# Patient Record
Sex: FEMALE | Race: BLACK OR AFRICAN AMERICAN | NOT HISPANIC OR LATINO | URBAN - METROPOLITAN AREA
[De-identification: names, ages, dates, MRNs, and addresses within clinical notes are randomized per-mention and may not be internally consistent; named-entity substitution may affect disease eponyms.]

---

## 2024-06-19 NOTE — PROGRESS NOTES
Subjective   Yolis Arriaza is a 29 y.o.  at 18w5d by very unsure LMP who presents for a initial prenatal visit with a Group [facility].     Patient has been having FM x2 months.     1:1 Visit:   Patient currently experiencing:  denies  Bleeding or cramping since LMP: no    Ultrasound completed this pregnancy: No    Taking prenatal vitamin: No, amenable to rx being sent     Last pap: unsure if she's ever had one, amenable to one today     Patient interested in nicotine gum to help with smoking cessation.     Patient denies any hx of HTN. She reports feeling fine today. She reports occasional not severe headaches (though nothing today).     Recently she reports being congested.     Postpartum Depression: Low Risk  (2024)    Pingree  Depression Scale     Last EPDS Total Score: 4     Last EPDS Self Harm Result: Never        OB History    Para Term  AB Living   3 2 2     2   SAB IAB Ectopic Multiple Live Births           2      # Outcome Date GA Lbr Rashaad/2nd Weight Sex Delivery Anes PTL Lv   3 Current            2 Term 08/17/15 38w0d  2.353 kg F CS-LTranv   TONO      Birth Comments: ERCB   1 Term 14 39w0d  2.637 kg F CS-LTranv   TONO      Birth Comments: meconium     Prior pregnancy complications:  meconium  History of hypertension:  No    History reviewed. No pertinent past medical history.   Past Surgical History:   Procedure Laterality Date     SECTION, LOW TRANSVERSE      x2      Social History     Tobacco Use    Smoking status: Every Day     Types: Cigarettes    Smokeless tobacco: Never   Vaping Use    Vaping status: Never Used   Substance Use Topics    Alcohol use: Not Currently    Drug use: Not Currently     Types: Marijuana        Objective   Physical Exam  Weight: 72.6 kg (160 lb)  Pregravid BMI: 23.33  BP: (!) 146/105 --> 149/90  Fundal height: 30  FHTs 150    Physical Exam  Constitutional:       Appearance: Normal appearance.   HENT:      Head: Normocephalic.    Cardiovascular:      Rate and Rhythm: Normal rate and regular rhythm.      Pulses: Normal pulses.      Heart sounds: Normal heart sounds.   Pulmonary:      Effort: Pulmonary effort is normal.      Breath sounds: Wheezing present.      Comments: Wheezes present on left side, not on right  Skin:     General: Skin is warm and dry.   Neurological:      Mental Status: She is alert.   Psychiatric:         Mood and Affect: Mood normal.         Behavior: Behavior normal.         Thought Content: Thought content normal.         Judgment: Judgment normal.         Problem List Items Addressed This Visit       Elevated BP without diagnosis of hypertension    Overview     146/105 and repeat 149/90  Reviewed with Dr Mayes; HELLP labs ordered, BP cuff sent, Nifed 60 rx sent  MD appointment in 1 week          Encounter for smoking cessation counseling    Overview     Nicotine gum rx sent  Encouraged decreasing/cessation          Hx of  section    Overview     X2  2014 per patient at term for meconium  2015 ERCB  *need to sign records release re OP notes          Other Visit Diagnoses       Supervision of other normal pregnancy, antepartum ([facility])    -  Primary    Pregnancy test positive ([facility])        Relevant Medications    prenatal vitamin, iron-folic, 27 mg iron-800 mcg folic acid tablet    nicotine polacrilex (Nicorette) 2 mg gum    blood pressure test kit-large (Quick Response BP Monitor-Larg) kit    NIFEdipine ER (Adalat CC) 30 mg 24 hr tablet    Other Relevant Orders    C. Trachomatis / N. Gonorrhoeae, Amplified Detection    CBC Anemia Panel With Reflex,Pregnancy    Hemoglobin Identification with Path Review    Hepatitis B Surface Antigen    Hepatitis C Antibody    HIV 1/2 Antigen/Antibody Screen with Reflex to Confirmation    Human Chorionic Gonadotropin, Serum Quantitative    Rubella Antibody, IgG    Syphilis Screen with Reflex    Trichomonas vaginalis, Nucleic Acid Detection    Type And Screen    Urine  Culture    Varicella Zoster Antibody, IgG    Comprehensive Metabolic Panel    Uric Acid    Protein, Urine Random    US MAC OB imaging order    THINPREP PAP    Smoking (tobacco) complicating childbirth ([facility])        Relevant Medications    nicotine polacrilex (Nicorette) 2 mg gum    Screening for malignant neoplasm of cervix        Relevant Orders    THINPREP PAP            Plan   - New OB resources provided and reviewed with particular attention to dietary, travel, and medication restrictions  - Oriented to practice, CNM vs. MD care  - Reviewed IOM recommendations for weight gain given pt's BMI: 15-25 pounds (BMI 25-29.9)  - Reviewed bleeding precautions, warning signs, when to call provider; phone number provided  - The following Rx were sent to pharmacy: PNV, nicotine gum, nifedipine   - Routine NOB labs ordered  - Additional labs added: HELLP  - Urine culture sent as part of labs for asymptomatic screening only   - Discussed Centering Pregnancy with patient, interested in Education only  - Dating ultrasound ordered asap   - encouraged PCP follow up re congestion   - Transfer care to MD due to cHTN on medicaiton   The following educational material was reviewed in the Group New [facility]:  Healthy lifestyle choices in pregnancy   Centering vs Individual prenatal care   - Reviewed reasons to call: heavy vaginal bleeding, loss of fluid, strong pelvic pain, any questions/concerns  - RTC in 4 weeks or prn for next ROBV  *next visit: records release from [place] for c-sections records, Bps     1:1 total time 20min  Group Visit total time 120min    ESVIN Cardenas-JAYDE

## 2024-06-24 ENCOUNTER — LAB (OUTPATIENT)
Dept: LAB | Facility: LAB | Age: 29
End: 2024-06-24
Payer: COMMERCIAL

## 2024-06-24 ENCOUNTER — INITIAL PRENATAL (OUTPATIENT)
Dept: OBSTETRICS AND GYNECOLOGY | Facility: CLINIC | Age: 29
End: 2024-06-24
Payer: COMMERCIAL

## 2024-06-24 ENCOUNTER — PHARMACY VISIT (OUTPATIENT)
Dept: PHARMACY | Facility: CLINIC | Age: 29
End: 2024-06-24
Payer: MEDICAID

## 2024-06-24 VITALS
SYSTOLIC BLOOD PRESSURE: 146 MMHG | DIASTOLIC BLOOD PRESSURE: 105 MMHG | BODY MASS INDEX: 26.66 KG/M2 | HEIGHT: 65 IN | WEIGHT: 160 LBS

## 2024-06-24 DIAGNOSIS — R03.0 ELEVATED BP WITHOUT DIAGNOSIS OF HYPERTENSION: ICD-10-CM

## 2024-06-24 DIAGNOSIS — Z34.80 SUPERVISION OF OTHER NORMAL PREGNANCY, ANTEPARTUM (HHS-HCC): Primary | ICD-10-CM

## 2024-06-24 DIAGNOSIS — Z71.6 ENCOUNTER FOR SMOKING CESSATION COUNSELING: ICD-10-CM

## 2024-06-24 DIAGNOSIS — Z32.01 PREGNANCY TEST POSITIVE (HHS-HCC): ICD-10-CM

## 2024-06-24 DIAGNOSIS — Z98.891 HX OF CESAREAN SECTION: ICD-10-CM

## 2024-06-24 DIAGNOSIS — Z12.4 SCREENING FOR MALIGNANT NEOPLASM OF CERVIX: ICD-10-CM

## 2024-06-24 LAB
ABO GROUP (TYPE) IN BLOOD: NORMAL
ALBUMIN SERPL BCP-MCNC: 3.4 G/DL (ref 3.4–5)
ALP SERPL-CCNC: 229 U/L (ref 33–110)
ALT SERPL W P-5'-P-CCNC: 5 U/L (ref 7–45)
ANION GAP SERPL CALC-SCNC: 15 MMOL/L (ref 10–20)
ANTIBODY SCREEN: NORMAL
AST SERPL W P-5'-P-CCNC: 14 U/L (ref 9–39)
B-HCG SERPL-ACNC: ABNORMAL MIU/ML
BILIRUB SERPL-MCNC: 0.4 MG/DL (ref 0–1.2)
BUN SERPL-MCNC: 3 MG/DL (ref 6–23)
CALCIUM SERPL-MCNC: 8.8 MG/DL (ref 8.6–10.6)
CHLORIDE SERPL-SCNC: 107 MMOL/L (ref 98–107)
CO2 SERPL-SCNC: 20 MMOL/L (ref 21–32)
CREAT SERPL-MCNC: 0.5 MG/DL (ref 0.5–1.05)
CREAT UR-MCNC: 73.3 MG/DL (ref 20–320)
EGFRCR SERPLBLD CKD-EPI 2021: >90 ML/MIN/1.73M*2
ERYTHROCYTE [DISTWIDTH] IN BLOOD BY AUTOMATED COUNT: 12.7 % (ref 11.5–14.5)
GLUCOSE SERPL-MCNC: 70 MG/DL (ref 74–99)
HBV SURFACE AG SERPL QL IA: NONREACTIVE
HCT VFR BLD AUTO: 35.4 % (ref 36–46)
HCV AB SER QL: NONREACTIVE
HGB BLD-MCNC: 12.1 G/DL (ref 12–16)
HIV 1+2 AB+HIV1 P24 AG SERPL QL IA: NONREACTIVE
MCH RBC QN AUTO: 30 PG (ref 26–34)
MCHC RBC AUTO-ENTMCNC: 34.2 G/DL (ref 32–36)
MCV RBC AUTO: 88 FL (ref 80–100)
NRBC BLD-RTO: 0 /100 WBCS (ref 0–0)
PLATELET # BLD AUTO: 319 X10*3/UL (ref 150–450)
POTASSIUM SERPL-SCNC: 3.8 MMOL/L (ref 3.5–5.3)
PROT SERPL-MCNC: 6.3 G/DL (ref 6.4–8.2)
PROT UR-ACNC: 11 MG/DL (ref 5–24)
PROT/CREAT UR: 0.15 MG/MG CREAT (ref 0–0.17)
RBC # BLD AUTO: 4.04 X10*6/UL (ref 4–5.2)
REFLEX ADDED, ANEMIA PANEL: NORMAL
RH FACTOR (ANTIGEN D): NORMAL
RUBV IGG SERPL IA-ACNC: 0.5 IA
RUBV IGG SERPL QL IA: NEGATIVE
SODIUM SERPL-SCNC: 138 MMOL/L (ref 136–145)
TREPONEMA PALLIDUM IGG+IGM AB [PRESENCE] IN SERUM OR PLASMA BY IMMUNOASSAY: NONREACTIVE
URATE SERPL-MCNC: 5.3 MG/DL (ref 2.3–6.7)
VARICELLA ZOSTER IGG INDEX: 0.3 IA
VZV IGG SER QL IA: NEGATIVE
WBC # BLD AUTO: 10.3 X10*3/UL (ref 4.4–11.3)

## 2024-06-24 PROCEDURE — 80053 COMPREHEN METABOLIC PANEL: CPT

## 2024-06-24 PROCEDURE — 86900 BLOOD TYPING SEROLOGIC ABO: CPT

## 2024-06-24 PROCEDURE — 99214 OFFICE O/P EST MOD 30 MIN: CPT | Performed by: ADVANCED PRACTICE MIDWIFE

## 2024-06-24 PROCEDURE — 99204 OFFICE O/P NEW MOD 45 MIN: CPT | Performed by: ADVANCED PRACTICE MIDWIFE

## 2024-06-24 PROCEDURE — 86780 TREPONEMA PALLIDUM: CPT

## 2024-06-24 PROCEDURE — 86317 IMMUNOASSAY INFECTIOUS AGENT: CPT

## 2024-06-24 PROCEDURE — 36415 COLL VENOUS BLD VENIPUNCTURE: CPT

## 2024-06-24 PROCEDURE — RXMED WILLOW AMBULATORY MEDICATION CHARGE

## 2024-06-24 PROCEDURE — 85027 COMPLETE CBC AUTOMATED: CPT

## 2024-06-24 PROCEDURE — 86850 RBC ANTIBODY SCREEN: CPT

## 2024-06-24 PROCEDURE — 86787 VARICELLA-ZOSTER ANTIBODY: CPT

## 2024-06-24 PROCEDURE — 84702 CHORIONIC GONADOTROPIN TEST: CPT

## 2024-06-24 PROCEDURE — 87661 TRICHOMONAS VAGINALIS AMPLIF: CPT | Performed by: ADVANCED PRACTICE MIDWIFE

## 2024-06-24 PROCEDURE — 82570 ASSAY OF URINE CREATININE: CPT | Performed by: ADVANCED PRACTICE MIDWIFE

## 2024-06-24 PROCEDURE — 84550 ASSAY OF BLOOD/URIC ACID: CPT

## 2024-06-24 PROCEDURE — 87389 HIV-1 AG W/HIV-1&-2 AB AG IA: CPT

## 2024-06-24 PROCEDURE — 86803 HEPATITIS C AB TEST: CPT

## 2024-06-24 PROCEDURE — 87340 HEPATITIS B SURFACE AG IA: CPT

## 2024-06-24 PROCEDURE — 87491 CHLMYD TRACH DNA AMP PROBE: CPT | Performed by: ADVANCED PRACTICE MIDWIFE

## 2024-06-24 PROCEDURE — 83021 HEMOGLOBIN CHROMOTOGRAPHY: CPT

## 2024-06-24 PROCEDURE — 86901 BLOOD TYPING SEROLOGIC RH(D): CPT

## 2024-06-24 PROCEDURE — 83020 HEMOGLOBIN ELECTROPHORESIS: CPT | Performed by: ADVANCED PRACTICE MIDWIFE

## 2024-06-24 PROCEDURE — 87086 URINE CULTURE/COLONY COUNT: CPT | Performed by: ADVANCED PRACTICE MIDWIFE

## 2024-06-24 RX ORDER — MICONAZOLE NITRATE 2 %
2 CREAM (GRAM) TOPICAL EVERY 2 HOUR PRN
Qty: 110 EACH | Refills: 3 | Status: SHIPPED | OUTPATIENT
Start: 2024-06-24 | End: 2024-07-24

## 2024-06-24 RX ORDER — NIFEDIPINE 30 MG/1
60 TABLET, FILM COATED, EXTENDED RELEASE ORAL
Qty: 60 TABLET | Refills: 11 | Status: SHIPPED | OUTPATIENT
Start: 2024-06-24 | End: 2025-06-24

## 2024-06-24 RX ORDER — ACETAMINOPHEN 500 MG
1 TABLET ORAL DAILY
Qty: 1 EACH | Refills: 0 | Status: SHIPPED | OUTPATIENT
Start: 2024-06-24

## 2024-06-24 ASSESSMENT — EDINBURGH POSTNATAL DEPRESSION SCALE (EPDS)
I HAVE BEEN SO UNHAPPY THAT I HAVE HAD DIFFICULTY SLEEPING: NOT AT ALL
I HAVE LOOKED FORWARD WITH ENJOYMENT TO THINGS: AS MUCH AS I EVER DID
I HAVE FELT SAD OR MISERABLE: NO, NOT AT ALL
THINGS HAVE BEEN GETTING ON TOP OF ME: NO, MOST OF THE TIME I HAVE COPED QUITE WELL
I HAVE BEEN ANXIOUS OR WORRIED FOR NO GOOD REASON: HARDLY EVER
THE THOUGHT OF HARMING MYSELF HAS OCCURRED TO ME: NEVER
TOTAL SCORE: 4
I HAVE BLAMED MYSELF UNNECESSARILY WHEN THINGS WENT WRONG: NOT VERY OFTEN
I HAVE FELT SCARED OR PANICKY FOR NO GOOD REASON: NO, NOT MUCH
I HAVE BEEN SO UNHAPPY THAT I HAVE BEEN CRYING: NO, NEVER
I HAVE BEEN ABLE TO LAUGH AND SEE THE FUNNY SIDE OF THINGS: AS MUCH AS I ALWAYS COULD

## 2024-06-25 ENCOUNTER — HOSPITAL ENCOUNTER (OUTPATIENT)
Dept: RADIOLOGY | Facility: CLINIC | Age: 29
Discharge: HOME | End: 2024-06-25
Payer: COMMERCIAL

## 2024-06-25 ENCOUNTER — ANCILLARY ORDERS (OUTPATIENT)
Dept: OBSTETRICS AND GYNECOLOGY | Facility: CLINIC | Age: 29
End: 2024-06-25
Payer: COMMERCIAL

## 2024-06-25 DIAGNOSIS — Z98.891 HX OF CESAREAN SECTION: ICD-10-CM

## 2024-06-25 DIAGNOSIS — Z32.01 PREGNANCY TEST POSITIVE (HHS-HCC): Primary | ICD-10-CM

## 2024-06-25 DIAGNOSIS — R03.0 ELEVATED BP WITHOUT DIAGNOSIS OF HYPERTENSION: ICD-10-CM

## 2024-06-25 DIAGNOSIS — Z28.39 RUBELLA NON-IMMUNE STATUS, ANTEPARTUM (HHS-HCC): ICD-10-CM

## 2024-06-25 DIAGNOSIS — O09.899 MATERNAL VARICELLA, NON-IMMUNE (HHS-HCC): ICD-10-CM

## 2024-06-25 DIAGNOSIS — Z03.74 ENCOUNTER FOR SUSPECTED PROBLEM WITH FETAL GROWTH RULED OUT: ICD-10-CM

## 2024-06-25 DIAGNOSIS — O09.899 RUBELLA NON-IMMUNE STATUS, ANTEPARTUM (HHS-HCC): ICD-10-CM

## 2024-06-25 DIAGNOSIS — Z32.01 PREGNANCY TEST POSITIVE (HHS-HCC): ICD-10-CM

## 2024-06-25 DIAGNOSIS — Z71.6 ENCOUNTER FOR SMOKING CESSATION COUNSELING: ICD-10-CM

## 2024-06-25 DIAGNOSIS — O10.013 PRE-EXISTING ESSENTIAL HYPERTENSION COMPLICATING PREGNANCY, THIRD TRIMESTER (HHS-HCC): ICD-10-CM

## 2024-06-25 DIAGNOSIS — Z28.39 MATERNAL VARICELLA, NON-IMMUNE (HHS-HCC): ICD-10-CM

## 2024-06-25 LAB
C TRACH RRNA SPEC QL NAA+PROBE: NEGATIVE
HEMOGLOBIN A2: 2.6 % (ref 2–3.5)
HEMOGLOBIN A: 97.1 % (ref 95.8–98)
HEMOGLOBIN F: 0.3 % (ref 0–2)
HEMOGLOBIN IDENTIFICATION INTERPRETATION: NORMAL
N GONORRHOEA DNA SPEC QL PROBE+SIG AMP: NEGATIVE
PATH REVIEW-HGB IDENTIFICATION: NORMAL
T VAGINALIS RRNA SPEC QL NAA+PROBE: NEGATIVE

## 2024-06-25 PROCEDURE — 76811 OB US DETAILED SNGL FETUS: CPT

## 2024-06-25 PROCEDURE — 76819 FETAL BIOPHYS PROFIL W/O NST: CPT

## 2024-06-25 PROCEDURE — 76811 OB US DETAILED SNGL FETUS: CPT | Performed by: OBSTETRICS & GYNECOLOGY

## 2024-06-25 PROCEDURE — 76819 FETAL BIOPHYS PROFIL W/O NST: CPT | Performed by: OBSTETRICS & GYNECOLOGY

## 2024-06-26 DIAGNOSIS — O23.43 URINARY TRACT INFECTION IN MOTHER DURING THIRD TRIMESTER OF PREGNANCY (HHS-HCC): Primary | ICD-10-CM

## 2024-06-26 LAB — BACTERIA UR CULT: ABNORMAL

## 2024-06-26 RX ORDER — NITROFURANTOIN 25; 75 MG/1; MG/1
100 CAPSULE ORAL 2 TIMES DAILY
Qty: 14 CAPSULE | Refills: 0 | Status: SHIPPED | OUTPATIENT
Start: 2024-06-26 | End: 2024-07-03

## 2024-07-01 ENCOUNTER — HOSPITAL ENCOUNTER (OUTPATIENT)
Dept: RADIOLOGY | Facility: CLINIC | Age: 29
Discharge: HOME | End: 2024-07-01
Payer: COMMERCIAL

## 2024-07-01 ENCOUNTER — PROCEDURE VISIT (OUTPATIENT)
Dept: OBSTETRICS AND GYNECOLOGY | Facility: CLINIC | Age: 29
End: 2024-07-01
Payer: COMMERCIAL

## 2024-07-01 ENCOUNTER — APPOINTMENT (OUTPATIENT)
Dept: LAB | Facility: LAB | Age: 29
End: 2024-07-01
Payer: COMMERCIAL

## 2024-07-01 ENCOUNTER — ROUTINE PRENATAL (OUTPATIENT)
Dept: OBSTETRICS AND GYNECOLOGY | Facility: CLINIC | Age: 29
End: 2024-07-01
Payer: COMMERCIAL

## 2024-07-01 ENCOUNTER — TELEPHONE (OUTPATIENT)
Dept: OBSTETRICS AND GYNECOLOGY | Facility: HOSPITAL | Age: 29
End: 2024-07-01

## 2024-07-01 ENCOUNTER — PHARMACY VISIT (OUTPATIENT)
Dept: PHARMACY | Facility: CLINIC | Age: 29
End: 2024-07-01
Payer: MEDICAID

## 2024-07-01 VITALS — DIASTOLIC BLOOD PRESSURE: 79 MMHG | SYSTOLIC BLOOD PRESSURE: 147 MMHG | WEIGHT: 161.8 LBS | BODY MASS INDEX: 26.96 KG/M2

## 2024-07-01 DIAGNOSIS — O36.8330 MATERNAL CARE FOR ABNORMALITIES OF THE FETAL HEART RATE OR RHYTHM, THIRD TRIMESTER, NOT APPLICABLE OR UNSPECIFIED (HHS-HCC): ICD-10-CM

## 2024-07-01 DIAGNOSIS — O10.013 PRE-EXISTING ESSENTIAL HYPERTENSION COMPLICATING PREGNANCY, THIRD TRIMESTER (HHS-HCC): ICD-10-CM

## 2024-07-01 DIAGNOSIS — O13.3 GESTATIONAL HYPERTENSION, THIRD TRIMESTER (HHS-HCC): Primary | ICD-10-CM

## 2024-07-01 DIAGNOSIS — Z03.74 ENCOUNTER FOR SUSPECTED PROBLEM WITH FETAL GROWTH RULED OUT: ICD-10-CM

## 2024-07-01 DIAGNOSIS — O13.3 GESTATIONAL HYPERTENSION, THIRD TRIMESTER (HHS-HCC): ICD-10-CM

## 2024-07-01 DIAGNOSIS — O09.93 SUPERVISION OF HIGH RISK PREGNANCY IN THIRD TRIMESTER (HHS-HCC): Primary | ICD-10-CM

## 2024-07-01 DIAGNOSIS — O23.43 URINARY TRACT INFECTION IN MOTHER DURING THIRD TRIMESTER OF PREGNANCY (HHS-HCC): ICD-10-CM

## 2024-07-01 DIAGNOSIS — Z98.891 HX OF CESAREAN SECTION: ICD-10-CM

## 2024-07-01 DIAGNOSIS — O09.33 LIMITED PRENATAL CARE IN THIRD TRIMESTER (HHS-HCC): ICD-10-CM

## 2024-07-01 LAB — GLUCOSE 1H P 50 G GLC PO SERPL-MCNC: 124 MG/DL

## 2024-07-01 PROCEDURE — 76819 FETAL BIOPHYS PROFIL W/O NST: CPT

## 2024-07-01 PROCEDURE — RXMED WILLOW AMBULATORY MEDICATION CHARGE

## 2024-07-01 PROCEDURE — 99214 OFFICE O/P EST MOD 30 MIN: CPT | Mod: GC,TH,25

## 2024-07-01 PROCEDURE — 90715 TDAP VACCINE 7 YRS/> IM: CPT | Mod: GC

## 2024-07-01 PROCEDURE — 82947 ASSAY GLUCOSE BLOOD QUANT: CPT

## 2024-07-01 PROCEDURE — 99214 OFFICE O/P EST MOD 30 MIN: CPT

## 2024-07-01 PROCEDURE — 36415 COLL VENOUS BLD VENIPUNCTURE: CPT

## 2024-07-01 PROCEDURE — 59025 FETAL NON-STRESS TEST: CPT | Performed by: OBSTETRICS & GYNECOLOGY

## 2024-07-01 RX ORDER — NITROFURANTOIN 25; 75 MG/1; MG/1
100 CAPSULE ORAL 2 TIMES DAILY
Qty: 14 CAPSULE | Refills: 0 | Status: SHIPPED | OUTPATIENT
Start: 2024-07-01 | End: 2024-07-08

## 2024-07-01 RX ORDER — ASPIRIN 81 MG/1
81 TABLET ORAL DAILY
Qty: 90 TABLET | Refills: 3 | Status: SHIPPED | OUTPATIENT
Start: 2024-07-01 | End: 2025-07-01

## 2024-07-01 NOTE — ASSESSMENT & PLAN NOTE
Late to establish care, New OB visit 6/24 with very unsure LMP dating approx 18wks.   Anatomy scan 6/25 dating 32wks.

## 2024-07-01 NOTE — PROGRESS NOTES
I saw and evaluated the patient. I personally obtained the key and critical portions of the history and physical exam or was physically present for key and critical portions performed by the resident/fellow. I reviewed the resident/fellow's documentation and discussed the patient with the resident/fellow. I agree with the resident/fellow's medical decision making as documented in the note.    Dinorah Turner MD

## 2024-07-01 NOTE — PROGRESS NOTES
OB Follow-up  24     SUBJECTIVE    HPI: Yolis Arriaza is a 29 y.o.  at 32w6d here for RPNV.   Late establish to prenatal care, initial prenatal visit 2 wks ago.  Denies contractions, bleeding, or LOF. Reports normal fetal movement. Patient reports headaches that occur when she wakes from naps that eventually go away with rest and tylenol. Denies vision changes, CP/SOB, RUQ pain.      OBJECTIVE  Visit Vitals  /79 Comment: 108   Wt 73.4 kg (161 lb 12.8 oz)   LMP 2024   BMI 26.96 kg/m²   OB Status Pregnant   Smoking Status Every Day   BSA 1.83 m²      FHT: 156    ASSESSMENT & PLAN  Yolis Arriaza is a 29 y.o.  at 32w6d here for the following concerns we addressed today:    Gestational hypertension, third trimester ([facility])  Home Bps have been 140s/90s on Nifed 60.   Discussed return precautions, added pt on for NST today.    Urinary tract infection in mother during third trimester of pregnancy ([facility])  Not yet taking abx, macrobid rx resent.    Hx of  section  States previously had low lying placenta in prior pregnancy.  Briefly discussed TOLAC versus elective repeat  section, including r/b/a of each.   Continue to discuss, if patient desires TOLAC will need consents signed at next visit.    Supervision of high risk pregnancy in third trimester ([facility])  Tdap today.  1 hr to be collected.  NST added on.    Limited prenatal care in third trimester ([facility])  Late to establish care, New OB visit  with very unsure LMP dating approx 18wks.   Anatomy scan  dating 32wks.       Orders Placed This Encounter   Procedures    Tdap vaccine, age 7 years and older  (BOOSTRIX)    Glucose, 1 Hour Screen, Pregnancy     Standing Status:   Future     Standing Expiration Date:   2025     Order Specific Question:   Release result to [place]     Answer:   Immediate [1]      RTC in 1 week    Patient seen and evaluated with Dr. Johnny Cline MD   PGY-2,  Obstetrics and Gynecology

## 2024-07-01 NOTE — PROCEDURES
Yolis Arriaza, a  at 32w6d with an STEPHON of 2024, by Ultrasound, was seen at [facility] for a nonstress test.    Non-Stress Test   Baseline Fetal Heart Rate for Non-Stress Test: 140 BPM  Variability in Waveform for Non-Stress Test: Moderate  Accelerations in Non-Stress Test: No  Decelerations in Non-Stress Test: None  Contractions in Non-Stress Test: Not present  Acoustic Stimulator for Non-Stress Test: Yes  Interpretation of Non-Stress Test   Interpretation of Non-Stress Test: (!) Non-reactive

## 2024-07-01 NOTE — TELEPHONE ENCOUNTER
----- Message from ESVIN Cardenas-CNM sent at 6/26/2024  1:36 PM EDT -----  UTI; macrobid rx sent  Please call and let her know about this  Please also see about how her Bps have been at home     Phone number on file not accepting phone calls  Patient seen in office today at [place] for appointment and was informed of urine culture results and treatment as well as home BP readings  Did not directly speak with patient regarding results, will notify provider  Marilyn Crawford RN

## 2024-07-01 NOTE — PATIENT INSTRUCTIONS
You can try compression stockings for the leg swelling.   Continue to take your Nifedipine 60mg daily. Take your blood pressure 2-3 times per day. Call the office or visit OB Triage if your blood pressure is greater than 160 (top number) or greater than 110 (bottom number). If you have a headache that is not responsive to tylenol, or vision changes, please visit OB triage to be evaluated.    You will need weekly appointments and NSTs.

## 2024-07-01 NOTE — ASSESSMENT & PLAN NOTE
Home Bps have been 140s/90s on Nifed 60.   Discussed return precautions, added pt on for NST today.

## 2024-07-01 NOTE — ASSESSMENT & PLAN NOTE
States previously had low lying placenta in prior pregnancy.  Briefly discussed TOLAC versus elective repeat  section, including r/b/a of each.   Continue to discuss, if patient desires TOLAC will need consents signed at next visit.

## 2024-07-03 PROBLEM — O09.33 LIMITED PRENATAL CARE IN THIRD TRIMESTER (HHS-HCC): Status: RESOLVED | Noted: 2024-07-01 | Resolved: 2024-07-03

## 2024-07-03 PROBLEM — O09.93 SUPERVISION OF HIGH RISK PREGNANCY IN THIRD TRIMESTER (HHS-HCC): Status: RESOLVED | Noted: 2024-07-01 | Resolved: 2024-07-03

## 2024-07-05 LAB
CYTOLOGY CMNT CVX/VAG CYTO-IMP: NORMAL
LAB AP HPV GENOTYPE QUESTION: YES
LAB AP HPV HR: NORMAL
LABORATORY COMMENT REPORT: NORMAL
LMP START DATE: NORMAL
MENSTRUAL HX REPORTED: NORMAL
PATH REPORT.TOTAL CANCER: NORMAL

## 2024-07-08 ENCOUNTER — APPOINTMENT (OUTPATIENT)
Dept: OBSTETRICS AND GYNECOLOGY | Facility: CLINIC | Age: 29
End: 2024-07-08
Payer: COMMERCIAL

## 2024-07-16 ENCOUNTER — APPOINTMENT (OUTPATIENT)
Dept: OBSTETRICS AND GYNECOLOGY | Facility: CLINIC | Age: 29
End: 2024-07-16
Payer: COMMERCIAL

## 2024-07-18 ENCOUNTER — ANESTHESIA EVENT (OUTPATIENT)
Dept: OBSTETRICS AND GYNECOLOGY | Facility: HOSPITAL | Age: 29
End: 2024-07-18
Payer: COMMERCIAL

## 2024-07-18 ENCOUNTER — HOSPITAL ENCOUNTER (INPATIENT)
Facility: HOSPITAL | Age: 29
LOS: 3 days | Discharge: HOME | End: 2024-07-21
Attending: SPECIALIST | Admitting: OBSTETRICS & GYNECOLOGY
Payer: COMMERCIAL

## 2024-07-18 ENCOUNTER — ANESTHESIA (OUTPATIENT)
Dept: OBSTETRICS AND GYNECOLOGY | Facility: HOSPITAL | Age: 29
End: 2024-07-18
Payer: COMMERCIAL

## 2024-07-18 DIAGNOSIS — O11.9 PRE-ECLAMPSIA SUPERIMPOSED ON CHRONIC HYPERTENSION (HHS-HCC): ICD-10-CM

## 2024-07-18 DIAGNOSIS — I10 HYPERTENSION, UNSPECIFIED TYPE: ICD-10-CM

## 2024-07-18 DIAGNOSIS — Z71.6 ENCOUNTER FOR TOBACCO USE CESSATION COUNSELING: ICD-10-CM

## 2024-07-18 PROBLEM — Z98.891 HISTORY OF CESAREAN DELIVERY: Status: ACTIVE | Noted: 2024-07-18

## 2024-07-18 PROBLEM — K21.9 GASTROESOPHAGEAL REFLUX DISEASE: Status: ACTIVE | Noted: 2024-07-18

## 2024-07-18 LAB
ABO GROUP (TYPE) IN BLOOD: NORMAL
ALBUMIN SERPL BCP-MCNC: 3 G/DL (ref 3.4–5)
ALBUMIN SERPL BCP-MCNC: 3.1 G/DL (ref 3.4–5)
ALP SERPL-CCNC: 225 U/L (ref 33–110)
ALP SERPL-CCNC: 263 U/L (ref 33–110)
ALT SERPL W P-5'-P-CCNC: 6 U/L (ref 7–45)
ALT SERPL W P-5'-P-CCNC: 7 U/L (ref 7–45)
ANION GAP SERPL CALC-SCNC: 14 MMOL/L (ref 10–20)
ANION GAP SERPL CALC-SCNC: 16 MMOL/L (ref 10–20)
ANTIBODY SCREEN: NORMAL
AST SERPL W P-5'-P-CCNC: 19 U/L (ref 9–39)
AST SERPL W P-5'-P-CCNC: 20 U/L (ref 9–39)
BILIRUB SERPL-MCNC: 0.3 MG/DL (ref 0–1.2)
BILIRUB SERPL-MCNC: 0.4 MG/DL (ref 0–1.2)
BLOOD EXPIRATION DATE: NORMAL
BUN SERPL-MCNC: 7 MG/DL (ref 6–23)
BUN SERPL-MCNC: 8 MG/DL (ref 6–23)
CALCIUM SERPL-MCNC: 7.9 MG/DL (ref 8.6–10.6)
CALCIUM SERPL-MCNC: 8.5 MG/DL (ref 8.6–10.6)
CHLORIDE SERPL-SCNC: 105 MMOL/L (ref 98–107)
CHLORIDE SERPL-SCNC: 108 MMOL/L (ref 98–107)
CO2 SERPL-SCNC: 17 MMOL/L (ref 21–32)
CO2 SERPL-SCNC: 19 MMOL/L (ref 21–32)
CREAT SERPL-MCNC: 0.45 MG/DL (ref 0.5–1.05)
CREAT SERPL-MCNC: 0.49 MG/DL (ref 0.5–1.05)
DISPENSE STATUS: NORMAL
EGFRCR SERPLBLD CKD-EPI 2021: >90 ML/MIN/1.73M*2
EGFRCR SERPLBLD CKD-EPI 2021: >90 ML/MIN/1.73M*2
ERYTHROCYTE [DISTWIDTH] IN BLOOD BY AUTOMATED COUNT: 12.5 % (ref 11.5–14.5)
ERYTHROCYTE [DISTWIDTH] IN BLOOD BY AUTOMATED COUNT: 12.9 % (ref 11.5–14.5)
GLUCOSE SERPL-MCNC: 68 MG/DL (ref 74–99)
GLUCOSE SERPL-MCNC: 81 MG/DL (ref 74–99)
HCT VFR BLD AUTO: 31.8 % (ref 36–46)
HCT VFR BLD AUTO: 36.3 % (ref 36–46)
HGB BLD-MCNC: 11.2 G/DL (ref 12–16)
HGB BLD-MCNC: 12.3 G/DL (ref 12–16)
MCH RBC QN AUTO: 28.9 PG (ref 26–34)
MCH RBC QN AUTO: 29 PG (ref 26–34)
MCHC RBC AUTO-ENTMCNC: 33.9 G/DL (ref 32–36)
MCHC RBC AUTO-ENTMCNC: 35.2 G/DL (ref 32–36)
MCV RBC AUTO: 82 FL (ref 80–100)
MCV RBC AUTO: 86 FL (ref 80–100)
NRBC BLD-RTO: 0 /100 WBCS (ref 0–0)
NRBC BLD-RTO: 0 /100 WBCS (ref 0–0)
PLATELET # BLD AUTO: 289 X10*3/UL (ref 150–450)
PLATELET # BLD AUTO: 297 X10*3/UL (ref 150–450)
POTASSIUM SERPL-SCNC: 3.9 MMOL/L (ref 3.5–5.3)
POTASSIUM SERPL-SCNC: 4.1 MMOL/L (ref 3.5–5.3)
PRODUCT BLOOD TYPE: 5100
PRODUCT CODE: NORMAL
PROT SERPL-MCNC: 5.5 G/DL (ref 6.4–8.2)
PROT SERPL-MCNC: 6.1 G/DL (ref 6.4–8.2)
RBC # BLD AUTO: 3.87 X10*6/UL (ref 4–5.2)
RBC # BLD AUTO: 4.24 X10*6/UL (ref 4–5.2)
RH FACTOR (ANTIGEN D): NORMAL
SODIUM SERPL-SCNC: 134 MMOL/L (ref 136–145)
SODIUM SERPL-SCNC: 137 MMOL/L (ref 136–145)
TREPONEMA PALLIDUM IGG+IGM AB [PRESENCE] IN SERUM OR PLASMA BY IMMUNOASSAY: NONREACTIVE
UNIT ABO: NORMAL
UNIT NUMBER: NORMAL
UNIT RH: NORMAL
UNIT VOLUME: 350
WBC # BLD AUTO: 10 X10*3/UL (ref 4.4–11.3)
WBC # BLD AUTO: 17.4 X10*3/UL (ref 4.4–11.3)
XM INTEP: NORMAL

## 2024-07-18 PROCEDURE — 99199 UNLISTED SPECIAL SVC PX/RPRT: CPT

## 2024-07-18 PROCEDURE — 2500000004 HC RX 250 GENERAL PHARMACY W/ HCPCS (ALT 636 FOR OP/ED)

## 2024-07-18 PROCEDURE — 1100000001 HC PRIVATE ROOM DAILY

## 2024-07-18 PROCEDURE — 80053 COMPREHEN METABOLIC PANEL: CPT | Performed by: STUDENT IN AN ORGANIZED HEALTH CARE EDUCATION/TRAINING PROGRAM

## 2024-07-18 PROCEDURE — 2500000004 HC RX 250 GENERAL PHARMACY W/ HCPCS (ALT 636 FOR OP/ED): Performed by: NURSE ANESTHETIST, CERTIFIED REGISTERED

## 2024-07-18 PROCEDURE — 7100000016 HC LABOR RECOVERY PER HOUR: Performed by: OBSTETRICS & GYNECOLOGY

## 2024-07-18 PROCEDURE — 36415 COLL VENOUS BLD VENIPUNCTURE: CPT

## 2024-07-18 PROCEDURE — 85027 COMPLETE CBC AUTOMATED: CPT | Performed by: STUDENT IN AN ORGANIZED HEALTH CARE EDUCATION/TRAINING PROGRAM

## 2024-07-18 PROCEDURE — 2500000004 HC RX 250 GENERAL PHARMACY W/ HCPCS (ALT 636 FOR OP/ED): Performed by: STUDENT IN AN ORGANIZED HEALTH CARE EDUCATION/TRAINING PROGRAM

## 2024-07-18 PROCEDURE — 87081 CULTURE SCREEN ONLY: CPT | Performed by: STUDENT IN AN ORGANIZED HEALTH CARE EDUCATION/TRAINING PROGRAM

## 2024-07-18 PROCEDURE — 2500000001 HC RX 250 WO HCPCS SELF ADMINISTERED DRUGS (ALT 637 FOR MEDICARE OP): Performed by: NURSE ANESTHETIST, CERTIFIED REGISTERED

## 2024-07-18 PROCEDURE — 86780 TREPONEMA PALLIDUM: CPT | Performed by: STUDENT IN AN ORGANIZED HEALTH CARE EDUCATION/TRAINING PROGRAM

## 2024-07-18 PROCEDURE — 59514 CESAREAN DELIVERY ONLY: CPT

## 2024-07-18 PROCEDURE — 36415 COLL VENOUS BLD VENIPUNCTURE: CPT | Performed by: STUDENT IN AN ORGANIZED HEALTH CARE EDUCATION/TRAINING PROGRAM

## 2024-07-18 PROCEDURE — 59514 CESAREAN DELIVERY ONLY: CPT | Performed by: OBSTETRICS & GYNECOLOGY

## 2024-07-18 PROCEDURE — 86901 BLOOD TYPING SEROLOGIC RH(D): CPT | Performed by: STUDENT IN AN ORGANIZED HEALTH CARE EDUCATION/TRAINING PROGRAM

## 2024-07-18 PROCEDURE — 99215 OFFICE O/P EST HI 40 MIN: CPT

## 2024-07-18 PROCEDURE — 2720000007 HC OR 272 NO HCPCS: Performed by: OBSTETRICS & GYNECOLOGY

## 2024-07-18 PROCEDURE — 2500000002 HC RX 250 W HCPCS SELF ADMINISTERED DRUGS (ALT 637 FOR MEDICARE OP, ALT 636 FOR OP/ED)

## 2024-07-18 PROCEDURE — 86923 COMPATIBILITY TEST ELECTRIC: CPT

## 2024-07-18 PROCEDURE — 0UH90HZ INSERTION OF CONTRACEPTIVE DEVICE INTO UTERUS, OPEN APPROACH: ICD-10-PCS | Performed by: OBSTETRICS & GYNECOLOGY

## 2024-07-18 PROCEDURE — 85027 COMPLETE CBC AUTOMATED: CPT

## 2024-07-18 PROCEDURE — 2500000001 HC RX 250 WO HCPCS SELF ADMINISTERED DRUGS (ALT 637 FOR MEDICARE OP)

## 2024-07-18 PROCEDURE — 58300 INSERT INTRAUTERINE DEVICE: CPT

## 2024-07-18 PROCEDURE — 2500000002 HC RX 250 W HCPCS SELF ADMINISTERED DRUGS (ALT 637 FOR MEDICARE OP, ALT 636 FOR OP/ED): Performed by: STUDENT IN AN ORGANIZED HEALTH CARE EDUCATION/TRAINING PROGRAM

## 2024-07-18 PROCEDURE — 80053 COMPREHEN METABOLIC PANEL: CPT

## 2024-07-18 PROCEDURE — 51702 INSERT TEMP BLADDER CATH: CPT

## 2024-07-18 PROCEDURE — 87086 URINE CULTURE/COLONY COUNT: CPT | Performed by: STUDENT IN AN ORGANIZED HEALTH CARE EDUCATION/TRAINING PROGRAM

## 2024-07-18 PROCEDURE — 3700000014 HC AN EPIDURAL BLOCK CHARGE: Performed by: OBSTETRICS & GYNECOLOGY

## 2024-07-18 RX ORDER — OXYTOCIN 10 [USP'U]/ML
10 INJECTION, SOLUTION INTRAMUSCULAR; INTRAVENOUS ONCE AS NEEDED
Status: DISCONTINUED | OUTPATIENT
Start: 2024-07-18 | End: 2024-07-21 | Stop reason: HOSPADM

## 2024-07-18 RX ORDER — NIFEDIPINE 30 MG/1
30 TABLET, FILM COATED, EXTENDED RELEASE ORAL ONCE
Status: COMPLETED | OUTPATIENT
Start: 2024-07-18 | End: 2024-07-18

## 2024-07-18 RX ORDER — MISOPROSTOL 200 UG/1
800 TABLET ORAL ONCE AS NEEDED
Status: DISCONTINUED | OUTPATIENT
Start: 2024-07-18 | End: 2024-07-18 | Stop reason: HOSPADM

## 2024-07-18 RX ORDER — BUPIVACAINE HYDROCHLORIDE 7.5 MG/ML
INJECTION, SOLUTION INTRASPINAL AS NEEDED
Status: DISCONTINUED | OUTPATIENT
Start: 2024-07-18 | End: 2024-07-18

## 2024-07-18 RX ORDER — ACETAMINOPHEN 120 MG/1
SUPPOSITORY RECTAL AS NEEDED
Status: DISCONTINUED | OUTPATIENT
Start: 2024-07-18 | End: 2024-07-18

## 2024-07-18 RX ORDER — CALCIUM GLUCONATE 98 MG/ML
1 INJECTION, SOLUTION INTRAVENOUS ONCE AS NEEDED
Status: DISCONTINUED | OUTPATIENT
Start: 2024-07-18 | End: 2024-07-18

## 2024-07-18 RX ORDER — ACETAMINOPHEN 325 MG/1
975 TABLET ORAL EVERY 6 HOURS
Status: DISCONTINUED | OUTPATIENT
Start: 2024-07-18 | End: 2024-07-21 | Stop reason: HOSPADM

## 2024-07-18 RX ORDER — OXYCODONE HYDROCHLORIDE 5 MG/1
5 TABLET ORAL EVERY 4 HOURS PRN
Status: DISCONTINUED | OUTPATIENT
Start: 2024-07-19 | End: 2024-07-21 | Stop reason: HOSPADM

## 2024-07-18 RX ORDER — DIPHENHYDRAMINE HYDROCHLORIDE 50 MG/ML
25 INJECTION INTRAMUSCULAR; INTRAVENOUS EVERY 4 HOURS PRN
Status: DISCONTINUED | OUTPATIENT
Start: 2024-07-18 | End: 2024-07-21 | Stop reason: HOSPADM

## 2024-07-18 RX ORDER — NIFEDIPINE 60 MG/1
120 TABLET, FILM COATED, EXTENDED RELEASE ORAL
Status: DISCONTINUED | OUTPATIENT
Start: 2024-07-19 | End: 2024-07-21 | Stop reason: HOSPADM

## 2024-07-18 RX ORDER — HYDROMORPHONE HYDROCHLORIDE 1 MG/ML
0.2 INJECTION, SOLUTION INTRAMUSCULAR; INTRAVENOUS; SUBCUTANEOUS EVERY 5 MIN PRN
Status: DISCONTINUED | OUTPATIENT
Start: 2024-07-18 | End: 2024-07-20

## 2024-07-18 RX ORDER — LOPERAMIDE HYDROCHLORIDE 2 MG/1
4 CAPSULE ORAL EVERY 2 HOUR PRN
Status: DISCONTINUED | OUTPATIENT
Start: 2024-07-18 | End: 2024-07-21 | Stop reason: HOSPADM

## 2024-07-18 RX ORDER — LABETALOL HYDROCHLORIDE 5 MG/ML
INJECTION, SOLUTION INTRAVENOUS
Status: COMPLETED
Start: 2024-07-18 | End: 2024-07-18

## 2024-07-18 RX ORDER — METHYLERGONOVINE MALEATE 0.2 MG/ML
0.2 INJECTION INTRAVENOUS ONCE AS NEEDED
Status: DISCONTINUED | OUTPATIENT
Start: 2024-07-18 | End: 2024-07-21 | Stop reason: HOSPADM

## 2024-07-18 RX ORDER — AZITHROMYCIN 100 MG/ML
INJECTION, POWDER, LYOPHILIZED, FOR SOLUTION INTRAVENOUS AS NEEDED
Status: DISCONTINUED | OUTPATIENT
Start: 2024-07-18 | End: 2024-07-18

## 2024-07-18 RX ORDER — DIPHENHYDRAMINE HCL 25 MG
25 CAPSULE ORAL EVERY 4 HOURS PRN
Status: DISCONTINUED | OUTPATIENT
Start: 2024-07-18 | End: 2024-07-21 | Stop reason: HOSPADM

## 2024-07-18 RX ORDER — BISACODYL 10 MG/1
10 SUPPOSITORY RECTAL DAILY PRN
Status: DISCONTINUED | OUTPATIENT
Start: 2024-07-18 | End: 2024-07-21 | Stop reason: HOSPADM

## 2024-07-18 RX ORDER — TERBUTALINE SULFATE 1 MG/ML
0.25 INJECTION SUBCUTANEOUS ONCE AS NEEDED
Status: DISCONTINUED | OUTPATIENT
Start: 2024-07-18 | End: 2024-07-18 | Stop reason: HOSPADM

## 2024-07-18 RX ORDER — OXYTOCIN/0.9 % SODIUM CHLORIDE 30/500 ML
60 PLASTIC BAG, INJECTION (ML) INTRAVENOUS ONCE AS NEEDED
Status: DISCONTINUED | OUTPATIENT
Start: 2024-07-18 | End: 2024-07-21 | Stop reason: HOSPADM

## 2024-07-18 RX ORDER — NIFEDIPINE 60 MG/1
60 TABLET, FILM COATED, EXTENDED RELEASE ORAL
Status: DISCONTINUED | OUTPATIENT
Start: 2024-07-19 | End: 2024-07-18

## 2024-07-18 RX ORDER — SODIUM CHLORIDE, SODIUM LACTATE, POTASSIUM CHLORIDE, CALCIUM CHLORIDE 600; 310; 30; 20 MG/100ML; MG/100ML; MG/100ML; MG/100ML
125 INJECTION, SOLUTION INTRAVENOUS CONTINUOUS
Status: DISCONTINUED | OUTPATIENT
Start: 2024-07-18 | End: 2024-07-18

## 2024-07-18 RX ORDER — IBUPROFEN 600 MG/1
600 TABLET ORAL EVERY 6 HOURS
Status: DISCONTINUED | OUTPATIENT
Start: 2024-07-19 | End: 2024-07-21 | Stop reason: HOSPADM

## 2024-07-18 RX ORDER — ENALAPRIL MALEATE 5 MG/1
5 TABLET ORAL NIGHTLY
Status: DISCONTINUED | OUTPATIENT
Start: 2024-07-18 | End: 2024-07-21 | Stop reason: HOSPADM

## 2024-07-18 RX ORDER — METOCLOPRAMIDE 10 MG/1
10 TABLET ORAL EVERY 6 HOURS PRN
Status: DISCONTINUED | OUTPATIENT
Start: 2024-07-18 | End: 2024-07-18

## 2024-07-18 RX ORDER — LABETALOL HYDROCHLORIDE 5 MG/ML
20 INJECTION, SOLUTION INTRAVENOUS ONCE AS NEEDED
Status: DISCONTINUED | OUTPATIENT
Start: 2024-07-18 | End: 2024-07-21 | Stop reason: HOSPADM

## 2024-07-18 RX ORDER — METHYLERGONOVINE MALEATE 0.2 MG/ML
0.2 INJECTION INTRAVENOUS ONCE AS NEEDED
Status: DISCONTINUED | OUTPATIENT
Start: 2024-07-18 | End: 2024-07-18 | Stop reason: HOSPADM

## 2024-07-18 RX ORDER — NIFEDIPINE 90 MG/1
90 TABLET, EXTENDED RELEASE ORAL
Status: DISCONTINUED | OUTPATIENT
Start: 2024-07-19 | End: 2024-07-18

## 2024-07-18 RX ORDER — LIDOCAINE 560 MG/1
1 PATCH PERCUTANEOUS; TOPICAL; TRANSDERMAL
Status: DISCONTINUED | OUTPATIENT
Start: 2024-07-18 | End: 2024-07-21 | Stop reason: HOSPADM

## 2024-07-18 RX ORDER — CEFAZOLIN 1 G/1
INJECTION, POWDER, FOR SOLUTION INTRAVENOUS AS NEEDED
Status: DISCONTINUED | OUTPATIENT
Start: 2024-07-18 | End: 2024-07-18

## 2024-07-18 RX ORDER — LABETALOL HYDROCHLORIDE 5 MG/ML
20 INJECTION, SOLUTION INTRAVENOUS ONCE
Status: DISCONTINUED | OUTPATIENT
Start: 2024-07-18 | End: 2024-07-18

## 2024-07-18 RX ORDER — OXYTOCIN 10 [USP'U]/ML
10 INJECTION, SOLUTION INTRAMUSCULAR; INTRAVENOUS ONCE AS NEEDED
Status: DISCONTINUED | OUTPATIENT
Start: 2024-07-18 | End: 2024-07-18 | Stop reason: HOSPADM

## 2024-07-18 RX ORDER — FENTANYL CITRATE 50 UG/ML
INJECTION, SOLUTION INTRAMUSCULAR; INTRAVENOUS AS NEEDED
Status: DISCONTINUED | OUTPATIENT
Start: 2024-07-18 | End: 2024-07-18

## 2024-07-18 RX ORDER — NIFEDIPINE 10 MG/1
10 CAPSULE ORAL ONCE AS NEEDED
Status: DISCONTINUED | OUTPATIENT
Start: 2024-07-18 | End: 2024-07-18 | Stop reason: HOSPADM

## 2024-07-18 RX ORDER — LIDOCAINE HYDROCHLORIDE 10 MG/ML
30 INJECTION INFILTRATION; PERINEURAL ONCE AS NEEDED
Status: DISCONTINUED | OUTPATIENT
Start: 2024-07-18 | End: 2024-07-18 | Stop reason: HOSPADM

## 2024-07-18 RX ORDER — KETOROLAC TROMETHAMINE 30 MG/ML
30 INJECTION, SOLUTION INTRAMUSCULAR; INTRAVENOUS EVERY 6 HOURS
Status: COMPLETED | OUTPATIENT
Start: 2024-07-18 | End: 2024-07-19

## 2024-07-18 RX ORDER — ONDANSETRON HYDROCHLORIDE 2 MG/ML
4 INJECTION, SOLUTION INTRAVENOUS EVERY 6 HOURS PRN
Status: DISCONTINUED | OUTPATIENT
Start: 2024-07-18 | End: 2024-07-21 | Stop reason: HOSPADM

## 2024-07-18 RX ORDER — HYDRALAZINE HYDROCHLORIDE 20 MG/ML
5 INJECTION INTRAMUSCULAR; INTRAVENOUS ONCE AS NEEDED
Status: DISCONTINUED | OUTPATIENT
Start: 2024-07-18 | End: 2024-07-21 | Stop reason: HOSPADM

## 2024-07-18 RX ORDER — KETOROLAC TROMETHAMINE 30 MG/ML
INJECTION, SOLUTION INTRAMUSCULAR; INTRAVENOUS AS NEEDED
Status: DISCONTINUED | OUTPATIENT
Start: 2024-07-18 | End: 2024-07-18

## 2024-07-18 RX ORDER — OXYCODONE HYDROCHLORIDE 5 MG/1
10 TABLET ORAL EVERY 4 HOURS PRN
Status: DISCONTINUED | OUTPATIENT
Start: 2024-07-19 | End: 2024-07-21 | Stop reason: HOSPADM

## 2024-07-18 RX ORDER — TRANEXAMIC ACID 100 MG/ML
1000 INJECTION, SOLUTION INTRAVENOUS ONCE AS NEEDED
Status: DISCONTINUED | OUTPATIENT
Start: 2024-07-18 | End: 2024-07-21 | Stop reason: HOSPADM

## 2024-07-18 RX ORDER — CARBOPROST TROMETHAMINE 250 UG/ML
250 INJECTION, SOLUTION INTRAMUSCULAR ONCE AS NEEDED
Status: DISCONTINUED | OUTPATIENT
Start: 2024-07-18 | End: 2024-07-18 | Stop reason: HOSPADM

## 2024-07-18 RX ORDER — POLYETHYLENE GLYCOL 3350 17 G/17G
17 POWDER, FOR SOLUTION ORAL 2 TIMES DAILY PRN
Status: DISCONTINUED | OUTPATIENT
Start: 2024-07-18 | End: 2024-07-21 | Stop reason: HOSPADM

## 2024-07-18 RX ORDER — MAGNESIUM SULFATE HEPTAHYDRATE 40 MG/ML
2 INJECTION, SOLUTION INTRAVENOUS CONTINUOUS
Status: DISCONTINUED | OUTPATIENT
Start: 2024-07-18 | End: 2024-07-20

## 2024-07-18 RX ORDER — NALOXONE HYDROCHLORIDE 0.4 MG/ML
0.1 INJECTION, SOLUTION INTRAMUSCULAR; INTRAVENOUS; SUBCUTANEOUS EVERY 5 MIN PRN
Status: DISCONTINUED | OUTPATIENT
Start: 2024-07-18 | End: 2024-07-21 | Stop reason: HOSPADM

## 2024-07-18 RX ORDER — ONDANSETRON 4 MG/1
4 TABLET, FILM COATED ORAL EVERY 6 HOURS PRN
Status: DISCONTINUED | OUTPATIENT
Start: 2024-07-18 | End: 2024-07-18

## 2024-07-18 RX ORDER — HYDRALAZINE HYDROCHLORIDE 20 MG/ML
5 INJECTION INTRAMUSCULAR; INTRAVENOUS ONCE AS NEEDED
Status: DISCONTINUED | OUTPATIENT
Start: 2024-07-18 | End: 2024-07-18 | Stop reason: HOSPADM

## 2024-07-18 RX ORDER — ONDANSETRON 4 MG/1
4 TABLET, FILM COATED ORAL EVERY 6 HOURS PRN
Status: DISCONTINUED | OUTPATIENT
Start: 2024-07-18 | End: 2024-07-21 | Stop reason: HOSPADM

## 2024-07-18 RX ORDER — CARBOPROST TROMETHAMINE 250 UG/ML
250 INJECTION, SOLUTION INTRAMUSCULAR ONCE AS NEEDED
Status: DISCONTINUED | OUTPATIENT
Start: 2024-07-18 | End: 2024-07-21 | Stop reason: HOSPADM

## 2024-07-18 RX ORDER — OXYTOCIN/0.9 % SODIUM CHLORIDE 30/500 ML
60 PLASTIC BAG, INJECTION (ML) INTRAVENOUS ONCE AS NEEDED
Status: DISCONTINUED | OUTPATIENT
Start: 2024-07-18 | End: 2024-07-18 | Stop reason: HOSPADM

## 2024-07-18 RX ORDER — SODIUM CITRATE AND CITRIC ACID MONOHYDRATE 334; 500 MG/5ML; MG/5ML
SOLUTION ORAL AS NEEDED
Status: DISCONTINUED | OUTPATIENT
Start: 2024-07-18 | End: 2024-07-18

## 2024-07-18 RX ORDER — MORPHINE SULFATE 1 MG/ML
INJECTION, SOLUTION EPIDURAL; INTRATHECAL; INTRAVENOUS AS NEEDED
Status: DISCONTINUED | OUTPATIENT
Start: 2024-07-18 | End: 2024-07-18

## 2024-07-18 RX ORDER — NIFEDIPINE 60 MG/1
60 TABLET, FILM COATED, EXTENDED RELEASE ORAL ONCE
Status: COMPLETED | OUTPATIENT
Start: 2024-07-18 | End: 2024-07-18

## 2024-07-18 RX ORDER — LABETALOL HYDROCHLORIDE 5 MG/ML
20 INJECTION, SOLUTION INTRAVENOUS ONCE AS NEEDED
Status: COMPLETED | OUTPATIENT
Start: 2024-07-18 | End: 2024-07-18

## 2024-07-18 RX ORDER — METOCLOPRAMIDE HYDROCHLORIDE 5 MG/ML
10 INJECTION INTRAMUSCULAR; INTRAVENOUS EVERY 6 HOURS PRN
Status: DISCONTINUED | OUTPATIENT
Start: 2024-07-18 | End: 2024-07-18

## 2024-07-18 RX ORDER — LOPERAMIDE HYDROCHLORIDE 2 MG/1
4 CAPSULE ORAL EVERY 2 HOUR PRN
Status: DISCONTINUED | OUTPATIENT
Start: 2024-07-18 | End: 2024-07-18 | Stop reason: HOSPADM

## 2024-07-18 RX ORDER — ADHESIVE BANDAGE
10 BANDAGE TOPICAL
Status: DISCONTINUED | OUTPATIENT
Start: 2024-07-18 | End: 2024-07-21 | Stop reason: HOSPADM

## 2024-07-18 RX ORDER — MAGNESIUM SULFATE HEPTAHYDRATE 40 MG/ML
INJECTION, SOLUTION INTRAVENOUS
Status: DISPENSED
Start: 2024-07-18 | End: 2024-07-18

## 2024-07-18 RX ORDER — FAMOTIDINE 10 MG/ML
INJECTION INTRAVENOUS AS NEEDED
Status: DISCONTINUED | OUTPATIENT
Start: 2024-07-18 | End: 2024-07-18

## 2024-07-18 RX ORDER — NIFEDIPINE 10 MG/1
10 CAPSULE ORAL ONCE AS NEEDED
Status: DISCONTINUED | OUTPATIENT
Start: 2024-07-18 | End: 2024-07-21 | Stop reason: HOSPADM

## 2024-07-18 RX ORDER — PHENYLEPHRINE 10 MG/250 ML(40 MCG/ML)IN 0.9 % SOD.CHLORIDE INTRAVENOUS
CONTINUOUS PRN
Status: DISCONTINUED | OUTPATIENT
Start: 2024-07-18 | End: 2024-07-18

## 2024-07-18 RX ORDER — TRANEXAMIC ACID 100 MG/ML
1000 INJECTION, SOLUTION INTRAVENOUS ONCE AS NEEDED
Status: DISCONTINUED | OUTPATIENT
Start: 2024-07-18 | End: 2024-07-18 | Stop reason: HOSPADM

## 2024-07-18 RX ORDER — SIMETHICONE 80 MG
80 TABLET,CHEWABLE ORAL 4 TIMES DAILY PRN
Status: DISCONTINUED | OUTPATIENT
Start: 2024-07-18 | End: 2024-07-21 | Stop reason: HOSPADM

## 2024-07-18 RX ORDER — MISOPROSTOL 200 UG/1
800 TABLET ORAL ONCE AS NEEDED
Status: DISCONTINUED | OUTPATIENT
Start: 2024-07-18 | End: 2024-07-21 | Stop reason: HOSPADM

## 2024-07-18 RX ORDER — HYDROMORPHONE HYDROCHLORIDE 1 MG/ML
0.2 INJECTION, SOLUTION INTRAMUSCULAR; INTRAVENOUS; SUBCUTANEOUS EVERY 5 MIN PRN
Status: DISCONTINUED | OUTPATIENT
Start: 2024-07-18 | End: 2024-07-21 | Stop reason: HOSPADM

## 2024-07-18 RX ORDER — SODIUM CHLORIDE, SODIUM LACTATE, POTASSIUM CHLORIDE, CALCIUM CHLORIDE 600; 310; 30; 20 MG/100ML; MG/100ML; MG/100ML; MG/100ML
75 INJECTION, SOLUTION INTRAVENOUS CONTINUOUS
Status: DISCONTINUED | OUTPATIENT
Start: 2024-07-18 | End: 2024-07-21 | Stop reason: HOSPADM

## 2024-07-18 RX ORDER — ONDANSETRON HYDROCHLORIDE 2 MG/ML
4 INJECTION, SOLUTION INTRAVENOUS EVERY 6 HOURS PRN
Status: DISCONTINUED | OUTPATIENT
Start: 2024-07-18 | End: 2024-07-18

## 2024-07-18 RX ADMIN — LABETALOL HYDROCHLORIDE 20 MG: 5 INJECTION INTRAVENOUS at 09:45

## 2024-07-18 RX ADMIN — NIFEDIPINE 60 MG: 60 TABLET, FILM COATED, EXTENDED RELEASE ORAL at 10:00

## 2024-07-18 RX ADMIN — LEVONORGESTREL 52 MG: 52 INTRAUTERINE DEVICE INTRAUTERINE at 10:00

## 2024-07-18 RX ADMIN — SODIUM CHLORIDE, POTASSIUM CHLORIDE, SODIUM LACTATE AND CALCIUM CHLORIDE 75 ML/HR: 600; 310; 30; 20 INJECTION, SOLUTION INTRAVENOUS at 12:05

## 2024-07-18 RX ADMIN — LABETALOL HYDROCHLORIDE 20 MG: 5 INJECTION, SOLUTION INTRAVENOUS at 09:45

## 2024-07-18 RX ADMIN — NIFEDIPINE 30 MG: 30 TABLET, FILM COATED, EXTENDED RELEASE ORAL at 18:00

## 2024-07-18 RX ADMIN — ACETAMINOPHEN 975 MG: 325 TABLET ORAL at 23:53

## 2024-07-18 RX ADMIN — ENALAPRIL MALEATE 5 MG: 5 TABLET ORAL at 22:37

## 2024-07-18 RX ADMIN — KETOROLAC TROMETHAMINE 30 MG: 30 INJECTION, SOLUTION INTRAMUSCULAR; INTRAVENOUS at 17:32

## 2024-07-18 RX ADMIN — ACETAMINOPHEN 975 MG: 325 TABLET ORAL at 17:32

## 2024-07-18 RX ADMIN — SODIUM CHLORIDE, POTASSIUM CHLORIDE, SODIUM LACTATE AND CALCIUM CHLORIDE 75 ML/HR: 600; 310; 30; 20 INJECTION, SOLUTION INTRAVENOUS at 16:29

## 2024-07-18 RX ADMIN — KETOROLAC TROMETHAMINE 30 MG: 30 INJECTION, SOLUTION INTRAMUSCULAR; INTRAVENOUS at 23:53

## 2024-07-18 RX ADMIN — NIFEDIPINE 30 MG: 30 TABLET, FILM COATED, EXTENDED RELEASE ORAL at 14:56

## 2024-07-18 RX ADMIN — MAGNESIUM SULFATE HEPTAHYDRATE 2 G/HR: 40 INJECTION, SOLUTION INTRAVENOUS at 17:47

## 2024-07-18 RX ADMIN — MAGNESIUM SULFATE HEPTAHYDRATE 2 G/HR: 40 INJECTION, SOLUTION INTRAVENOUS at 10:24

## 2024-07-18 SDOH — HEALTH STABILITY: MENTAL HEALTH: WISH TO BE DEAD (PAST 1 MONTH): NO

## 2024-07-18 SDOH — HEALTH STABILITY: MENTAL HEALTH: HAVE YOU USED ANY SUBSTANCES (CANABIS, COCAINE, HEROIN, HALLUCINOGENS, INHALANTS, ETC.) IN THE PAST 12 MONTHS?: NO

## 2024-07-18 SDOH — ECONOMIC STABILITY: HOUSING INSECURITY: DO YOU FEEL UNSAFE GOING BACK TO THE PLACE WHERE YOU ARE LIVING?: NO

## 2024-07-18 SDOH — SOCIAL STABILITY: SOCIAL INSECURITY: PHYSICAL ABUSE: DENIES

## 2024-07-18 SDOH — SOCIAL STABILITY: SOCIAL INSECURITY: ARE THERE ANY APPARENT SIGNS OF INJURIES/BEHAVIORS THAT COULD BE RELATED TO ABUSE/NEGLECT?: NO

## 2024-07-18 SDOH — HEALTH STABILITY: MENTAL HEALTH: NON-SPECIFIC ACTIVE SUICIDAL THOUGHTS (PAST 1 MONTH): NO

## 2024-07-18 SDOH — HEALTH STABILITY: MENTAL HEALTH: SUICIDAL BEHAVIOR (LIFETIME): NO

## 2024-07-18 SDOH — SOCIAL STABILITY: SOCIAL INSECURITY: HAVE YOU HAD THOUGHTS OF HARMING ANYONE ELSE?: NO

## 2024-07-18 SDOH — SOCIAL STABILITY: SOCIAL INSECURITY: DO YOU FEEL ANYONE HAS EXPLOITED OR TAKEN ADVANTAGE OF YOU FINANCIALLY OR OF YOUR PERSONAL PROPERTY?: NO

## 2024-07-18 SDOH — SOCIAL STABILITY: SOCIAL INSECURITY: HAVE YOU HAD ANY THOUGHTS OF HARMING ANYONE ELSE?: NO

## 2024-07-18 SDOH — SOCIAL STABILITY: SOCIAL INSECURITY: ARE YOU OR HAVE YOU BEEN THREATENED OR ABUSED PHYSICALLY, EMOTIONALLY, OR SEXUALLY BY ANYONE?: NO

## 2024-07-18 SDOH — HEALTH STABILITY: MENTAL HEALTH: WERE YOU ABLE TO COMPLETE ALL THE BEHAVIORAL HEALTH SCREENINGS?: YES

## 2024-07-18 SDOH — SOCIAL STABILITY: SOCIAL INSECURITY: ABUSE SCREEN: ADULT

## 2024-07-18 SDOH — SOCIAL STABILITY: SOCIAL INSECURITY: HAS ANYONE EVER THREATENED TO HURT YOUR FAMILY OR YOUR PETS?: NO

## 2024-07-18 SDOH — HEALTH STABILITY: MENTAL HEALTH: HAVE YOU USED ANY PRESCRIPTION DRUGS OTHER THAN PRESCRIBED IN THE PAST 12 MONTHS?: NO

## 2024-07-18 SDOH — HEALTH STABILITY: MENTAL HEALTH: STRENGTHS (MUST CHOOSE TWO): DEMONSTRATES EFFECTIVE COPING SKILLS;SUPPORT FROM FAMILY

## 2024-07-18 SDOH — SOCIAL STABILITY: SOCIAL INSECURITY: DOES ANYONE TRY TO KEEP YOU FROM HAVING/CONTACTING OTHER FRIENDS OR DOING THINGS OUTSIDE YOUR HOME?: NO

## 2024-07-18 SDOH — HEALTH STABILITY: MENTAL HEALTH: CURRENT SMOKER: 1

## 2024-07-18 SDOH — SOCIAL STABILITY: SOCIAL INSECURITY: VERBAL ABUSE: DENIES

## 2024-07-18 ASSESSMENT — PATIENT HEALTH QUESTIONNAIRE - PHQ9
1. LITTLE INTEREST OR PLEASURE IN DOING THINGS: NOT AT ALL
2. FEELING DOWN, DEPRESSED OR HOPELESS: NOT AT ALL
SUM OF ALL RESPONSES TO PHQ9 QUESTIONS 1 & 2: 0

## 2024-07-18 ASSESSMENT — ACTIVITIES OF DAILY LIVING (ADL): LACK_OF_TRANSPORTATION: NO

## 2024-07-18 ASSESSMENT — LIFESTYLE VARIABLES
AUDIT-C TOTAL SCORE: 0
HOW MANY STANDARD DRINKS CONTAINING ALCOHOL DO YOU HAVE ON A TYPICAL DAY: PATIENT DOES NOT DRINK
SKIP TO QUESTIONS 9-10: 1
HOW OFTEN DO YOU HAVE 6 OR MORE DRINKS ON ONE OCCASION: NEVER
HOW OFTEN DO YOU HAVE A DRINK CONTAINING ALCOHOL: NEVER
AUDIT-C TOTAL SCORE: 0

## 2024-07-18 ASSESSMENT — PAIN SCALES - GENERAL
PAINLEVEL_OUTOF10: 0 - NO PAIN
PAINLEVEL_OUTOF10: 10 - WORST POSSIBLE PAIN
PAINLEVEL_OUTOF10: 2
PAINLEVEL_OUTOF10: 1
PAINLEVEL_OUTOF10: 0 - NO PAIN
PAINLEVEL_OUTOF10: 0 - NO PAIN

## 2024-07-18 ASSESSMENT — PAIN - FUNCTIONAL ASSESSMENT: PAIN_FUNCTIONAL_ASSESSMENT: 0-10

## 2024-07-18 ASSESSMENT — COLUMBIA-SUICIDE SEVERITY RATING SCALE - C-SSRS
1. IN THE PAST MONTH, HAVE YOU WISHED YOU WERE DEAD OR WISHED YOU COULD GO TO SLEEP AND NOT WAKE UP?: NO
6. HAVE YOU EVER DONE ANYTHING, STARTED TO DO ANYTHING, OR PREPARED TO DO ANYTHING TO END YOUR LIFE?: NO
2. HAVE YOU ACTUALLY HAD ANY THOUGHTS OF KILLING YOURSELF?: NO

## 2024-07-18 ASSESSMENT — PAIN DESCRIPTION - LOCATION: LOCATION: INCISION

## 2024-07-18 NOTE — SIGNIFICANT EVENT
"Transfer Note:    S) Patient resting in bed with her infant comfortably. She denies headache, RUQ pain, visual disturbances, chest pain, SOB.    O)  Blood pressure (!) 150/95, pulse 62, temperature 36.1 °C (97 °F), resp. rate 20, height 1.651 m (5' 5\"), weight 75 kg (165 lb 5.5 oz), last menstrual period 02/14/2024, SpO2 99%, unknown if currently breastfeeding.    A&P)  29 y.o. G3 now  is POD 0 from a rCS and IUD placement at 35w2d  for rCS, siPEC wSF     siPECwSF  - Diagnosed by sever range Bps requiring IV treatment  - Asymptomatic  - HELLP labs negx1, 2nd set ordered at 1600  - On Mg until 0730 7/19  - On Nifedipin 90 mg     Postpartum Care  - continue routine postpartum care  - pain well controlled  - dvt risk score 3   - Rh: positive     Patient stable for transfer to [facility]    Bernarda Parada MD PGY2      "

## 2024-07-18 NOTE — ANESTHESIA PROCEDURE NOTES
Spinal Block    Patient location during procedure: OB  Start time: 7/18/2024 10:13 AM  End time: 7/18/2024 10:21 AM  Reason for block: primary anesthetic  Staffing  Performed: CRNA and attending   Authorized by: JOSÉ MANUEL Butler    Performed by: JOSÉ MANUEL Butler    Preanesthetic Checklist  Completed: patient identified, IV checked, site marked, risks and benefits discussed, surgical consent, monitors and equipment checked, pre-op evaluation, timeout performed and sterile techniques followed  Block Timeout  RN/Licensed healthcare professional reads aloud to the Anesthesia provider and entire team: Patient identity, procedure with side and site, patient position, and as applicable the availability of implants/special equipment/special requirements.  Patient on coagulant treatment: no  Timeout performed at: 7/18/2024 10:12 AM  Spinal Block  Patient position: sitting  Prep: ChloraPrep  Sterility prep: cap, drape, gloves, hand hygiene and mask  Sedation level: no sedation  Patient monitoring: blood pressure, continuous pulse oximetry, ETCO2 and heart rate  Approach: midline  Vertebral space: L3-4  Injection technique: single-shot  Needle  Needle type: pencil-point   Needle gauge: 24 G  Needle length: 4 in  Free flowing CSF: yes    Assessment  Sensory level: T4 bilateral  Block outcome: Allis test negative  Procedure assessment: patient tolerated procedure well with no immediate complications  Additional Notes  First attempt by CRNA  Placed on second attempt by attending

## 2024-07-18 NOTE — H&P
Obstetrical Admission History and Physical    Assessment/Plan    Yolis Arriaza is a 29 y.o.  at 35w2d, STEPHON: 2024, by Ultrasound admitted for repeat CS in setting of  labor and siPEC w/SF.     labor, Repeat CS  History of CS x 2 (op reports not available), presenting in  labor and very uncomfortably nolan. Discussed that this is indication for delivery in addition to siPEC w/SF (below). Will proceed with repeat CS.  T&C x 1u pRBC  Admitted, consented. Reviewed risks of CS including bleeding, infection, damage to nearby structures including bowel, bladder, ovaries, nerves, blood vessels, possible injury to fetus, possible need for hysterectomy. Patient expressed understanding.  For routine postpartum care after CS    siPEC w/SF  Patient previously with cHTN on nifedipine 60mg, though did not take today  Severe range BPs on presentation requiring treatment with IV labetalol 20mgx1  HELLP labs pending, for second set postpartum  Asymptomatic  Counseled on likely diagnosis of siPEC w/SF given severe range BPs and risk of seizures, stroke, maternal morbidity and fetal morbidity. Reviewed recommendation for BP control and magnesium to reduce seizure risk. Will start on magnesium 6g bolus followed by 2g/hr, continue for 24 hours postpartum.    Fetal wellbeing  Reviewed  delivery with patient and possibility of transfer to NICU postpartum if indicated  Given that moving toward CS urgently, no indication for late  BMZ  FHT reassuring on presentation  GBS collected     Pregnancy notables:   Limited prenatal care, dated by 18 wk US  UTI in pregnancy with uncertain treatment, will order urine culture on admission  Varicella nonimmune, for MMR postpartum  Nicotine use in pregnancy, on nicotine replacement    Postpartum planning:  Contraception: pp LNG-IUD, added to consent  Feeding: will address    Patient seen and discussed with Dr. Tosha Cat MD  PGY-4,  Obstetrics and Gynecology    Subjective   Patient presents to triage uncomfortably nolan q1min. Denies LOF, VB, decreased FM. Denies headache, vision changes, RUQ pain, chest pain, SOB.    Pregnancy notable for:  - Hx Csx2, op reports not available. First for NRFHT, second for previa  - Chronic HTN, on nifedipine 60mg daily. Baseline HELLP labs wnl, P:C 0.15  - Limited prenatal care, one visit at 18 weeks, one at 32 weeks  - Tobacco use, on nicotine replacement  - Varicella nonimmune  - UTI in pregnancy, did not complete treatment    Obstetrical History   OB History    Para Term  AB Living   3 2 2     2   SAB IAB Ectopic Multiple Live Births           2      # Outcome Date GA Lbr Rashaad/2nd Weight Sex Type Anes PTL Lv   3 Current            2 Term 08/17/15 38w0d  2.353 kg F CS-LTranv   TONO      Birth Comments: ERCB   1 Term 14 39w0d  2.637 kg F CS-LTranv   TONO      Birth Comments: meconium       Past Medical History  No past medical history on file.     Past Surgical History   Past Surgical History:   Procedure Laterality Date     SECTION, LOW TRANSVERSE      x2       Social History  Social History     Tobacco Use    Smoking status: Every Day     Types: Cigarettes    Smokeless tobacco: Never   Substance Use Topics    Alcohol use: Not Currently     Substance and Sexual Activity   Drug Use Not Currently    Types: Marijuana       Allergies  Patient has no known allergies.     Medications  Medications Prior to Admission   Medication Sig Dispense Refill Last Dose    aspirin 81 mg EC tablet Take 1 tablet (81 mg) by mouth once daily. 90 tablet 3 Past Month    blood pressure test kit-large (Quick Response BP Monitor-Larg) kit 1 Units once daily. 1 each 0 2024    nicotine polacrilex (Nicorette) 2 mg gum Chew 1 each (2 mg) every 2 hours if needed for smoking cessation. 110 each 3 Past Month    NIFEdipine ER (Adalat CC) 30 mg 24 hr tablet Take 2 tablets (60 mg) by mouth once daily in the  morning. Take before meals. Do not crush, chew, or split. 60 tablet 11 7/17/2024    prenatal vitamin, iron-folic, 27 mg iron-800 mcg folic acid tablet Take 1 tablet by mouth once daily. 90 tablet 3 7/17/2024       Objective    Last Vitals  Temp Pulse Resp BP MAP O2 Sat   36.6 °C (97.9 °F) 63 20 (!) 141/67   98 %     Physical Examination  General: very uncomfortable with contractions  HEENT: normocephalic, atraumatic  Heart: warm and well perfused  Lungs: breathing comfortably on room air  Abdomen: gravid  Extremities: moving all extremities  Neuro: awake and conversant  Psych: appropriate mood and affect  BSUS: cephalic  SVE 1/90/-1  FHT: 125/mod/+accel/-decel  Corn: q1-2min    Lab Review  Labs in chart have been reviewed.

## 2024-07-18 NOTE — DISCHARGE INSTRUCTIONS

## 2024-07-18 NOTE — L&D DELIVERY NOTE
OB Delivery Note-  Section  2024  Yolis Arriaza  29 y.o.       Gestational Age: 35w2d  /Para:   Quantitative Blood Loss: Admission to Discharge: 698 mL (2024  9:19 AM - 2024  1:29 PM)    Rayo Arriaza [[id number]]      Labor Events    Fluid color: Meconium  Fluid odor: None  Labor type:  Without Labor  Labor allowed to proceed with plans for an attempted vaginal birth?: No  Complications: None       Labor Event Times    Labor onset date/time: 2024 0938       Placenta    Placenta delivery date/time: 2024 1040  Placenta removal: Expressed  Placenta appearance: Intact  Placenta disposition: pathology       Anesthesia    Method: Spinal       Operative Delivery    Forceps attempted?: No  Vacuum extractor attempted?: No       Shoulder Dystocia    Shoulder dystocia present?: No        Delivery    Birth date/time: 2024 10:38:00  Delivery type:   Complications: None       Resuscitation    Method: Tactile stimulation       Apgars    Living status: Living  Apgar Component Scores:  1 min.:  5 min.:  10 min.:  15 min.:  20 min.:    Skin color:  1  1       Heart rate:  2  2       Reflex irritability:  2  2       Muscle tone:  2  2       Respiratory effort:  2  2       Total:  9  9       Apgars assigned by: YARIEL LAO RN       Delivery Providers    Delivering clinician:    Provider Role     Delivery Nurse    Shelley Lao, RN Nursery Nurse     Resident                 Intrauterine Device Inserted: Yes, with  delivery   Intrauterine Device Inserted: IUD Device Type: Liletta      Date: 2024  [place] [facility] [place] [facility]    Name: Yolis Arriaza, : 1995, Age: 29 y.o., MRN: [id number], Sex: female    Diagnosis  Preop Diagnosis  1- Repeat  section  2- Pregnancy at 35w2d  3- Superimposed preeclampsia with severe features Postop Diagnosis  Same     Procedures    * OBGYN Delivery  Section  Intrauterine Device Placement    Surgeons       * Olu Givens - Primary    Resident/Fellow/Other Assistant:  Surgeons and Role:     * Patti Cat MD - Resident - Assisting     * Bernarda Parada MD - Resident - Assisting    Procedure Summary  Anesthesia: Spinal  ASA: III  Anesthesia Staff: Anesthesiologist: Sujatha Jiang MD  CRNA: ESVIN Butler-CRNA  Estimated Blood Loss: 690 mL           Anesthesia Record               Intraprocedure I/O Totals          Intake    Oxytocin Drip 0.00 mL    The total shown is the total volume documented since Anesthesia Start was filed.    Phenylephrine Drip 0.00 mL    The total shown is the total volume documented since Anesthesia Start was filed.    Total Intake 0 mL       Output    Urine 110 mL    Total Blood Loss - Surgical Delivery (mL) 698 mL    Total Output 808 mL       Net    Net Volume -808 mL       Other (could not be determined as input or output)    Surgical Delivery Estimated Blood Loss (mL) 698          Specimen:   ID Type Source Tests Collected by Time   1 : placenta Tissue PLACENTA SINGLE SURGICAL PATHOLOGY EXAM Olu Givens MD 2024 1113        Staff:   Scrub Person: Iam  Circulator: Ana         Informed Consent:  The risks, benefits, complications, and alternatives were discussed with the patient. The patient understood that the risks of  section include, but are not limited to: injury to nearby structures or organs, infection, blood loss and possible need for transfusion, and potential need for more surgery including hysterectomy. The patient stated understanding and desired to proceed. All questions were answered.   The risks, benefits, and alternatives to immediate postplacental intrauterine device insertion were discussed with the patient.  Specifically, we discussed the possible risks of bleeding, infection, perforation, failure, increased risk of ectopic should pregnancy occur, and the higher risk of expulsion.  Written consent was obtained prior to the procedure and  is detailed in the patient’s record.      The site of surgery was properly noted and marked. The patient was identified as Yolis Arriaza and the procedure verified as a  delivery. A Time Out was held and the above information confirmed.    Procedure Details:  Patient was taken to the OR where combined spinal epidural anesthesia was redosed.  She was then placed in the dorsal supine position with a left lateral tilt. A still catheter was placed, SCDs were applied, and a vaginal prep was performed. A pre-procedure time out was performed.  The patient was given prophylactic dose of IV antibiotics. She was then prepped and draped in the usual sterile fashion.   A Pfannenstiel skin incision was made through the skin with the scalpel and then carried through the subcutaneous fat to the underlying fascial layer with sharp dissection. The fascia was incised on either side of the midline with the scalpel, and fascia was then dissected off the rectus muscle bilaterally using electrocautery. The superior aspect of the incision was grasped, tented up with Kocher clamps and the rectus muscle was dissected off bluntly. The muscles were divided in the midline, the peritoneum was identified and then entered bluntly, and incision extended superiorly and inferiorly with good visualization of bladder below. Bladder blade was inserted. A low transverse uterine incision was made with the scalpel, the uterine cavity was entered, and the hysterotomy was extended bilaterally with cephalocaudal stretching.   The infant was delivered atraumatically, the cord was clamped and cut, and infant was handed off to the awaiting nursing staff. A blood sample was collected. The placenta was then expressed. The uterus was exteriorized and cleared of all clot and debris.The uterine incision was repaired using a running stitch of 0-Vicryl. A Liletta IUD was placed at the uterine fundus prior to complete closure of the hysterotomy and the  strings were advanced to the cervix with ring forceps. A figure of 8 suture was used to provide hemostasis on the right end and  cautery on the left end of hysterotomy. Overall good hemostasis was noted on reexamine.   The uterus was then placed back inside the pelvis. The hysterotomy was again evaluated and found to be hemostatic, and the underside of the fascia and bladder and the rectus muscles were also found to be hemostatic. The fascia was closed using a running stitch of 0-PDS.   The subcutaneous layer was irrigated, small bleeding vessels were cauterized, and the subcutaneous layer was reapproximated using a running stitch of 2-0 Monocryl. The skin was closed with 3-0 Monocryl.    All counts were correct, the patient tolerated the procedure well. Dr. Givens was present for all key portions of the procedure. The patient and infant were taken back to the recovery room in stable condition.     Findings: Normal uterus, normal ovaries and Fallopian tubes, amniotic fluid with meconium, 1930 gr female infant    Complications:  None; patient tolerated the procedure well.     Disposition:  L&D room  Condition: stable    Bernarda Parada MD PGY2

## 2024-07-18 NOTE — ANESTHESIA PREPROCEDURE EVALUATION
Patient: Yolis Arriaza    Evaluation Method: In-person visit    Procedure Information    Anesthesia Start Date/Time: 24 1005  Procedure: Procedure Not Yet Scheduled         Relevant Problems   Anesthesia (within normal limits)      Cardiac  preE   (+) HTN (hypertension)      Pulmonary (within normal limits)      Neuro (within normal limits)      GI   (+) Gastroesophageal reflux disease      /Renal   (+) Urinary tract infection in mother during third trimester of pregnancy ([facility]HS-HCC)      Liver (within normal limits)      Endocrine (within normal limits)      Hematology (within normal limits)      ID   (+) Urinary tract infection in mother during third trimester of pregnancy (HHS-HCC)       Clinical information reviewed:    Allergies  Meds               NPO Detail:  NPO/Void Status  Date of Last Liquid: 24  Time of Last Liquid:   Date of Last Solid: 24  Time of Last Solid:          OB/Gyn Evaluation    Present Pregnancy    Patient is pregnant now.  (+) , previous  section, hypertensive disorder of pregnancy - preeclampsia   Obstetric History            Physical Exam    Airway  Mallampati: II  TM distance: >3 FB  Neck ROM: full     Cardiovascular    Dental - normal exam     Pulmonary    Abdominal          Anesthesia Plan    History of general anesthesia?: no  History of complications of general anesthesia?: unknown/emergency    ASA 3     spinal     The patient is a current smoker.    Anesthetic plan and risks discussed with patient and father.  Use of blood products discussed with patient and father who consented to blood products.    Plan discussed with CRNA.

## 2024-07-18 NOTE — CARE PLAN
The patient's goals for the shift include      The clinical goals for the shift include bond with baby

## 2024-07-18 NOTE — ANESTHESIA POSTPROCEDURE EVALUATION
Patient: Yolis Arriaza    Procedure Summary       Date: 24 Room / Location: [facility]    Anesthesia Start: 1005 Anesthesia Stop: 1142    Procedure: OBGYN Delivery  Section Diagnosis: (Prior Uterine Surgery)    Surgeons: Olu Givens MD Responsible Provider: Sujatha Jiang MD    Anesthesia Type: spinal ASA Status: 3            Anesthesia Type: spinal    Vitals Value Taken Time   /71 24 1140   Temp 36 24 1143   Pulse 69 24 1140   Resp 20 24 1143   SpO2 99 % 24 1140       Anesthesia Post Evaluation    Patient location during evaluation: floor  Patient participation: complete - patient participated  Level of consciousness: awake and alert  Pain management: adequate  Airway patency: patent  Cardiovascular status: acceptable and hemodynamically stable  Respiratory status: acceptable and room air  Hydration status: acceptable  Postoperative Nausea and Vomiting: none        No notable events documented.

## 2024-07-19 LAB
BACTERIA UR CULT: NO GROWTH
ERYTHROCYTE [DISTWIDTH] IN BLOOD BY AUTOMATED COUNT: 12.6 % (ref 11.5–14.5)
HCT VFR BLD AUTO: 33.3 % (ref 36–46)
HGB BLD-MCNC: 11.6 G/DL (ref 12–16)
MCH RBC QN AUTO: 29.2 PG (ref 26–34)
MCHC RBC AUTO-ENTMCNC: 34.8 G/DL (ref 32–36)
MCV RBC AUTO: 84 FL (ref 80–100)
NRBC BLD-RTO: 0 /100 WBCS (ref 0–0)
PLATELET # BLD AUTO: 331 X10*3/UL (ref 150–450)
RBC # BLD AUTO: 3.97 X10*6/UL (ref 4–5.2)
WBC # BLD AUTO: 15.9 X10*3/UL (ref 4.4–11.3)

## 2024-07-19 PROCEDURE — 1100000001 HC PRIVATE ROOM DAILY

## 2024-07-19 PROCEDURE — 2500000001 HC RX 250 WO HCPCS SELF ADMINISTERED DRUGS (ALT 637 FOR MEDICARE OP)

## 2024-07-19 PROCEDURE — 2500000004 HC RX 250 GENERAL PHARMACY W/ HCPCS (ALT 636 FOR OP/ED)

## 2024-07-19 PROCEDURE — 99199 UNLISTED SPECIAL SVC PX/RPRT: CPT

## 2024-07-19 PROCEDURE — 85027 COMPLETE CBC AUTOMATED: CPT

## 2024-07-19 PROCEDURE — 2500000002 HC RX 250 W HCPCS SELF ADMINISTERED DRUGS (ALT 637 FOR MEDICARE OP, ALT 636 FOR OP/ED): Performed by: STUDENT IN AN ORGANIZED HEALTH CARE EDUCATION/TRAINING PROGRAM

## 2024-07-19 PROCEDURE — 36415 COLL VENOUS BLD VENIPUNCTURE: CPT

## 2024-07-19 RX ADMIN — MAGNESIUM SULFATE HEPTAHYDRATE 2 G/HR: 40 INJECTION, SOLUTION INTRAVENOUS at 03:40

## 2024-07-19 RX ADMIN — ENALAPRIL MALEATE 5 MG: 5 TABLET ORAL at 20:26

## 2024-07-19 RX ADMIN — ACETAMINOPHEN 975 MG: 325 TABLET ORAL at 11:47

## 2024-07-19 RX ADMIN — ACETAMINOPHEN 975 MG: 325 TABLET ORAL at 23:39

## 2024-07-19 RX ADMIN — ACETAMINOPHEN 975 MG: 325 TABLET ORAL at 05:46

## 2024-07-19 RX ADMIN — KETOROLAC TROMETHAMINE 30 MG: 30 INJECTION, SOLUTION INTRAMUSCULAR; INTRAVENOUS at 05:46

## 2024-07-19 RX ADMIN — IBUPROFEN 600 MG: 600 TABLET, FILM COATED ORAL at 17:43

## 2024-07-19 RX ADMIN — NIFEDIPINE 120 MG: 60 TABLET, FILM COATED, EXTENDED RELEASE ORAL at 06:51

## 2024-07-19 RX ADMIN — ACETAMINOPHEN 975 MG: 325 TABLET ORAL at 17:43

## 2024-07-19 RX ADMIN — IBUPROFEN 600 MG: 600 TABLET, FILM COATED ORAL at 23:39

## 2024-07-19 RX ADMIN — IBUPROFEN 600 MG: 600 TABLET, FILM COATED ORAL at 11:48

## 2024-07-19 RX ADMIN — POLYETHYLENE GLYCOL 3350 17 G: 17 POWDER, FOR SOLUTION ORAL at 20:38

## 2024-07-19 ASSESSMENT — PAIN SCALES - GENERAL
PAINLEVEL_OUTOF10: 0 - NO PAIN
PAINLEVEL_OUTOF10: 7
PAINLEVEL_OUTOF10: 0 - NO PAIN
PAINLEVEL_OUTOF10: 4
PAINLEVEL_OUTOF10: 5 - MODERATE PAIN
PAINLEVEL_OUTOF10: 0 - NO PAIN

## 2024-07-19 ASSESSMENT — PAIN DESCRIPTION - DESCRIPTORS
DESCRIPTORS: DISCOMFORT;OTHER (COMMENT)
DESCRIPTORS: DISCOMFORT;SORE

## 2024-07-19 NOTE — LACTATION NOTE
Lactation Consultant Note    Recommendations/Summary  Mom states that she is both breast and formula feeding her 35 weeker. Baby has latched a couple of times so far but was still under 24 hours of life at the time of our visit. Discussed normal  feeding behavior during the first and second 24 hours of life. Reviewed typical feeding behavior for  newborns. Encouraged mom to attempt to latch baby in skin to skin every 2-3 hours and then to follow up with up to 20 mls of expressed milk or formula via syringe or paced bottle feeding, depending on volume. Also encouraged mom to pump both breasts for 15 mins after breastfeeding attempts. Reviewed benefits of skin to skin contact for mom and baby and for mom's milk production and supply. Reviewed outpatient lactation information. Mom needs a breast pump for at home so I will order her a [employer]ra via [employer]. Encouraged mom to call out for latching assistance from lactation today.

## 2024-07-19 NOTE — SIGNIFICANT EVENT
Patient meets criteria for home monitoring of blood pressure post discharge.  Reason: current preeclampsia with severe features,. Met with patient to assess for availability of home BP monitor.  Patient stated she owns home BP monitor. . Patient educated on importance of continuing to monitor BP at home, recording BP on home monitoring log and s/sx of when to call her provider.  Pt verbalized understanding the above information.

## 2024-07-19 NOTE — PROGRESS NOTES
Postpartum Progress Note    Assessment/Plan   Yolis Arriaza is a 29 y.o.  now s/p , Low Transverse on 2024 admitted for postpartum magnesium in the setting of severe pre-eclampsia.     Severe pre-eclampsia  Diagnosed by severe range BPs requiring IV treatment  Current antihypertensive regimen: nifedipine 120, enalapril 5mg  Normotensive  HELLP labs neg x2  Asymptomatic  Continue magnesium 2g/hr for seizure prophylaxis until 1030, no signs/symptoms of mag toxicity  Campbell draining clear, light yellow urine    Postpartum care  Continue routine postpartum care  Breastfeeding, lactation consultation as needed  Contraception plan: Liletta IUD in place    Pregnancy notables:  Limited prenatal care, dated by 18 wk US  UTI in pregnancy with uncertain tx adherence, urine culture pending  Rubella & Varicella nonimmune, for MMR and varicella vaccines postpartum  Nicotine use in pregnancy, on nicotine replacement      Patient seen and evaluated with medical student. Agree with assessment above, edits made within text.    Patti Cat MD  PGY-4, Obstetrics and Gynecology      Subjective   Patient resting comfortably in bed holding  on chest.     Objective   Last Vitals:  Temp Pulse Resp BP MAP Pulse Ox   36.7 °C (98.1 °F) 82 16 137/81   97 %     Vitals Min/Max Last 24 Hours:  Temp  Min: 35.4 °C (95.7 °F)  Max: 36.7 °C (98.1 °F)  Pulse  Min: 54  Max: 89  Resp  Min: 12  Max: 22  BP  Min: 107/67  Max: 162/87    Intake/Output:    Intake/Output Summary (Last 24 hours) at 2024 1053  Last data filed at 2024 1003  Gross per 24 hour   Intake 3781.74 ml   Output 5360 ml   Net -1578.26 ml       Physical Examination:  General: no acute distress  HEENT: normocephalic, atraumatic  Heart: normal rate, regular rhythm  Lungs: CTAB  Abdomen: soft, nondistended  Extremities: moving all extremities  Neuro: awake and conversant, reflexes per RN  Psych: appropriate mood and affect    Lab Review:  Results  from last 7 days   Lab Units 07/18/24  1651 07/18/24  0940   WBC AUTO x10*3/uL 17.4* 10.0   HEMOGLOBIN g/dL 11.2* 12.3   HEMATOCRIT % 31.8* 36.3   PLATELETS AUTO x10*3/uL 289 297   AST U/L 19 20   ALT U/L 6* 7   CREATININE mg/dL 0.45* 0.49*         I saw and evaluated the patient. I personally obtained the key and critical portions of the history and physical exam or was physically present for key and critical portions performed by the resident/fellow. I reviewed the resident/fellow's documentation and discussed the patient with the resident/fellow. I agree with the resident/fellow's medical decision making as documented in the note. Pt feeling well. Denies HA/SOB.  Continue Magnesium for 24 hours.    Karri Márquez MD

## 2024-07-19 NOTE — PROGRESS NOTES
Postpartum Progress Note    Assessment/Plan   Yolis Arriaza is a 29 y.o.  now s/p , Low Vertical on 2024 admitted for postpartum magnesium in the setting of severe pre-eclampsia.     Severe pre-eclampsia  Diagnosed by severe range BPs requiring IV treatment  Current antihypertensive regimen: nifed 120, enalapril 5mg ( PM); uptitrated given persistent MRBP readings despite consistent uptitration during the day.   HELLP labs negx2   Asymptomatic  Continue magnesium 2g/hr for seizure prophylaxis until 730, no signs/symptoms of mag toxicity   Lungs clear to auscultation and reflexes 2+    Postpartum care  Continue routine postpartum care  Breastfeeding, lactation consultation as needed  Contraception plan:    Pregnancy notables:  Limited prenatal care, dated by 18 wk US  UTI in pregnancy with uncertain treatment, urine culture pending  Varicella nonimmune, for MMR postpartum  Nicotine use in pregnancy, on nicotine replacement    Tana Hudson MD PGY-1   OBGYN    Subjective   Pt is lying comfortably in bed with her baby. Pt is feeling well, denies HA, CP, SOB, vision changes, RUQ pain. No concerns at this time. Pain is well controlled. Pt asymptomatic during times of elevated BPs.    Objective   Last Vitals:  Temp Pulse Resp BP MAP Pulse Ox   36.4 °C (97.5 °F) 74 18 (!) 147/98   97 %     Vitals Min/Max Last 24 Hours:  Temp  Min: 35.4 °C (95.7 °F)  Max: 36.6 °C (97.9 °F)  Pulse  Min: 54  Max: 80  Resp  Min: 18  Max: 20  BP  Min: 113/71  Max: 185/88    Intake/Output:    Intake/Output Summary (Last 24 hours) at 2024  Last data filed at 2024 1940  Gross per 24 hour   Intake 1982.25 ml   Output 1823 ml   Net 159.25 ml       Physical Examination:  General: no acute distress  HEENT: normocephalic, atraumatic  Heart: normal rate, regular rhythm  Lungs: CTAB  Abdomen: soft, nondistended  Extremities: moving all extremities  Neuro: awake and conversant, reflexes per RN  Psych:  appropriate mood and affect    Lab Review:  Results from last 7 days   Lab Units 07/18/24  1651 07/18/24  0940   WBC AUTO x10*3/uL 17.4* 10.0   HEMOGLOBIN g/dL 11.2* 12.3   HEMATOCRIT % 31.8* 36.3   PLATELETS AUTO x10*3/uL 289 297   AST U/L 19 20   ALT U/L 6* 7   CREATININE mg/dL 0.45* 0.49*

## 2024-07-19 NOTE — LACTATION NOTE
Lactation Consultant Note  Lactation Consultation  Reason for Consult: Initial assessment  Consultant Name: Vicki Haines RN, IBCLC    Maternal Information  Has mother  before?: No  Infant to breast within first 2 hours of birth?: Yes  Exclusive Pump and Bottle Feed: No    Maternal Assessment  Breast Assessment: Medium, Soft, Compressible  Nipple Assessment: Intact, Erect  Areola Assessment: Normal    Infant Assessment  Infant Behavior: Awake, Active alert  Infant Assessment: Premature, Tongue humped/bunched/retracted/elevated, Able to elevate tongue to roof of mouth    Feeding Assessment  Nutrition Source: Breastmilk  Feeding Method: Nursing at the breast  Feeding Position: Breast sandwich, Cross - cradle, Skin to skin, Nipple to nose, Mother needs assistance with latch/positioning, Chin tucked into breast, Nose lightly touching breast  Suck/Feeding: Sustained, Audible swallowing, Swallowing intermittently only with encouragement, Tactile stimulation needed, Audible swallowing with stimulaton  Latch Assessment: Moderate assistance is needed, Instructed on deep latch, Areolar attachment, Latch achieved, Deep latch obtained, Wide open mouth < 160, Comfortable with no pain, Optimal angle of mouth opening, Bursts of sucking, swallowing, and rest, Chin moves in rhythmic motion, Latch achieved after repeated attempts    LATCH TOOL  Latch: Repeated attempts, hold nipple in mouth, stimulate to suck  Audible Swallowing: A few with stimulation  Type of Nipple: Everted (After stimulation)  Comfort (Breast/Nipple): Soft/non-tender  Hold (Positioning): Minimal assist, teach one side, mother does other, staff holds  LATCH Score: 7    Breast Pump  Pump: Hospital grade electric pump  Duration: Initiate phase  Breast Shield Size and Type: 21 mm    Other OB Lactation Tools       Patient Follow-up  Inpatient Lactation Follow-up Needed : Yes  Outpatient Lactation Follow-up: Recommended    Other OB Lactation  Documentation  Maternal Risk Factors: Hypertension, Preeclampsia,  delivery <37 weeks  Infant Risk Factors: Prematurity <37 weeks, Low birth weight <2500 g    Recommendations/Summary  Baby put to breast after low blood sugar. Infant placed skin to skin with mom, and attempted to latch, but she kept tongue sucking. Baby moved back from mom and suck training was demonstrated. After second round of suck training, baby was able to latch on deeply. Areolar attachment, nose and chin touching breast, rhythmic sucking and some audible swallowing. Baby needed tactile stimulation to stay alert at breast and with stimulation, more swallows were noted.  Baby fed effectively for about 10mins and was then placed skin to skin with mom.      Encouraged mom to call for assistance with latching until she feels more confident. Reviewed importance of deep latch and how to achieve this.     Educated mom on the need for pumping after feeds due to baby's prematurity, and mom stated understanding.  RN to bring pump supplies to mom.  Mom does not have a pump for home, so a request was faxed to [facility] on her behalf. Outpatient lactation info left at bedside. Peter harris.

## 2024-07-20 PROBLEM — O13.3 GESTATIONAL HYPERTENSION, THIRD TRIMESTER (HHS-HCC): Status: RESOLVED | Noted: 2024-06-24 | Resolved: 2024-07-20

## 2024-07-20 LAB — GP B STREP GENITAL QL CULT: ABNORMAL

## 2024-07-20 PROCEDURE — 2500000001 HC RX 250 WO HCPCS SELF ADMINISTERED DRUGS (ALT 637 FOR MEDICARE OP)

## 2024-07-20 PROCEDURE — 1100000001 HC PRIVATE ROOM DAILY

## 2024-07-20 PROCEDURE — 2500000002 HC RX 250 W HCPCS SELF ADMINISTERED DRUGS (ALT 637 FOR MEDICARE OP, ALT 636 FOR OP/ED)

## 2024-07-20 PROCEDURE — S4991 NICOTINE PATCH NONLEGEND: HCPCS

## 2024-07-20 PROCEDURE — 2500000004 HC RX 250 GENERAL PHARMACY W/ HCPCS (ALT 636 FOR OP/ED)

## 2024-07-20 PROCEDURE — 2500000002 HC RX 250 W HCPCS SELF ADMINISTERED DRUGS (ALT 637 FOR MEDICARE OP, ALT 636 FOR OP/ED): Performed by: STUDENT IN AN ORGANIZED HEALTH CARE EDUCATION/TRAINING PROGRAM

## 2024-07-20 RX ORDER — NICOTINE 7MG/24HR
1 PATCH, TRANSDERMAL 24 HOURS TRANSDERMAL DAILY
Status: DISCONTINUED | OUTPATIENT
Start: 2024-07-20 | End: 2024-07-21 | Stop reason: HOSPADM

## 2024-07-20 RX ADMIN — IBUPROFEN 600 MG: 600 TABLET, FILM COATED ORAL at 06:03

## 2024-07-20 RX ADMIN — IBUPROFEN 600 MG: 600 TABLET, FILM COATED ORAL at 12:28

## 2024-07-20 RX ADMIN — ACETAMINOPHEN 975 MG: 325 TABLET ORAL at 18:41

## 2024-07-20 RX ADMIN — NICOTINE 7 MG/24 HR DAILY TRANSDERMAL PATCH 1 PATCH: at 16:24

## 2024-07-20 RX ADMIN — ACETAMINOPHEN 975 MG: 325 TABLET ORAL at 12:28

## 2024-07-20 RX ADMIN — POLYETHYLENE GLYCOL 3350 17 G: 17 POWDER, FOR SOLUTION ORAL at 08:48

## 2024-07-20 RX ADMIN — IBUPROFEN 600 MG: 600 TABLET, FILM COATED ORAL at 18:41

## 2024-07-20 RX ADMIN — ENALAPRIL MALEATE 5 MG: 5 TABLET ORAL at 20:57

## 2024-07-20 RX ADMIN — NIFEDIPINE 120 MG: 60 TABLET, FILM COATED, EXTENDED RELEASE ORAL at 06:03

## 2024-07-20 RX ADMIN — ACETAMINOPHEN 975 MG: 325 TABLET ORAL at 06:03

## 2024-07-20 ASSESSMENT — PAIN DESCRIPTION - DESCRIPTORS
DESCRIPTORS: ACHING;SORE
DESCRIPTORS: ACHING;DISCOMFORT;SORE

## 2024-07-20 ASSESSMENT — PAIN SCALES - GENERAL
PAINLEVEL_OUTOF10: 5 - MODERATE PAIN
PAINLEVEL_OUTOF10: 0 - NO PAIN
PAINLEVEL_OUTOF10: 4
PAINLEVEL_OUTOF10: 1

## 2024-07-20 NOTE — PROGRESS NOTES
Postpartum Progress Note    Assessment/Plan   Yolis Arriaza is a 29 y.o., , who delivered at 35w2d gestation and is now postpartum day 2.    s/p rLTCS on   - continue routine postoperative care  - pain well controlled, transition to PO meds   - Hgb   Results from last 7 days   Lab Units 24  1947 24  1651 24  0940   HEMOGLOBIN g/dL 11.6* 11.2* 12.3     - DVT Score: 3, for ppx SCDs  - h/o UTI in preg- urine culture negative on admission  - tobacco use, nicotine patch 7 mg ordered    siPEC w/SF  - Patient previously with cHTN on nifedipine 60mg, though did not take on day of admission  - Severe range BPs on presentation requiring treatment with IV labetalol 20mgx1  - HELLP labs neg x2  - Asymptomatic  - s/p Mg  - Nifedipine 120 mg ( PM), BP WNL    Maternal Well-Being  - emotional support provided  - bonding with infant     Feeding  - breastfeeding/pumping encouraged; lactation consult prn     Contraception  - ppIUD    Dispo  - Anticipate DC PPD3 pending BP control.  - The signs and symptoms of PEC were reviewed with the patient, including unrelenting headache, vision changes/blurred vision, and pain underneath the right breast.   - BP cuff for home for checking BP BID. Pt instructed to call primary OB if SBP > 160 or DBP > 110.  - On discharge, follow up with primary OB in 2-5 days for BP check, 2 weeks for incision check, and 4-6 weeks for postpartum visit.      Principal Problem:     delivery delivered ([facility])  Active Problems:    Hx of  section    Encounter for smoking cessation counseling    Maternal varicella, non-immune ([facility])    Rubella non-immune status, antepartum ([facility])    Urinary tract infection in mother during third trimester of pregnancy ([facility])    History of  delivery    HTN (hypertension)    Gastroesophageal reflux disease    Pre-eclampsia superimposed on chronic hypertension ([facility])    Pregnancy Problems (from 24 to  present)       Problem Noted Resolved    Pre-eclampsia superimposed on chronic hypertension ([facility]) 2024 by Bernarda Parada MD No    Priority:  Medium      Urinary tract infection in mother during third trimester of pregnancy ([facility]) 2024 by JOHNNY Cardenas No    Priority:  Medium      Overview Signed 2024  1:36 PM by JOHNNY Cardenas     +UTI at new OB  [ ] VIJAY         Maternal varicella, non-immune ([facility]) 2024 by JOHNNY Cardenas No    Priority:  Medium      Overview Signed 2024  8:38 AM by JOHNNY Cardenas     -offer vaccination PP            Rubella non-immune status, antepartum ([facility]) 2024 by JOHNNY Cardenas No    Priority:  Medium      Overview Signed 2024  8:38 AM by JOHNNY Cardenas     -offer vaccination PP            Hx of  section 2024 by JOHNNY Cardensa No    Priority:  Medium      Overview Signed 2024  1:34 PM by JOHNNY Cardenas     X2  2014 per patient at term for meconium  2015 ERCB  *need to sign records release re OP notes         Encounter for smoking cessation counseling 2024 by JOHNNY Cardenas No    Priority:  Medium      Overview Signed 2024  1:35 PM by JOHNNY Cardenas     Nicotine gum rx sent  Encouraged decreasing/cessation          Gestational hypertension, third trimester ([facility]) 2024 by JOHNNY Cardenas No    Priority:  Medium      Overview Addendum 2024  9:49 AM by Bijal Cline MD     Baseline HELLP labs WNL, p/c 0.15,   BP cuff sent, Nifed 60mg daily  Weekly NSTs  Delivery 37wks           Supervision of high risk pregnancy in third trimester ([facility]) 2024 by Bijal Cline MD 7/3/2024 by LOVE CardenasJAYDE    Overview Addendum 2024  9:22 AM by Bijal Cline MD     Dating: very unsure LMP,  dated by 32wk anatomy US  [x] Initial BMI: 26  [x] Prenatal Labs: wnl, at 32 wks  [] Aneuploidy Screening:    [] Baby ASA:  [x] Anatomy US: limited, repeat saida for   [x] 1hr GCT at 24-28wks: to be collected   [x] Tdap (27-36wks): completed   [x] Rhogam (if Rh neg): N/A O POS  [] GBS at 36 wks:  [] Breastfeeding  [] PPBC:   [] 39 weeks discussion of IOL vs. Expectant management:   [] Mode of delivery: hx CS x2, unsure if wants to TOLAC or rCS.         Limited prenatal care in third trimester ([facility]) 2024 by Bijal Cline MD 7/3/2024 by JOHNNY Cardenas    Overview Signed 2024  9:49 AM by Bijal Cline MD     Late to establish care, New OB visit  with very unsure LMP dating approx 18wks.   Anatomy scan  dating 32wks.               Hospital course: postpartum preeclampsia/eclampsia   section delivery  Patient is not breastfeedingThe patient's blood type is O POS. The baby's blood type is O POS. Rhogam is not indicated.    Subjective   Her pain is well controlled with current medications  She is passing flatus  She is ambulating well  She is tolerating a Adult diet Regular  She reports no breast or nursing problems  She denies emotional concerns today   Her plan for contraception is IUD w/progesterone     Denies HA, SOB, RUQ pain, vision changes.     Objective   Allergies:   Patient has no known allergies.         Last Vitals:  Temp Pulse Resp BP MAP Pulse Ox   37.2 °C (99 °F) 95 17 119/69   96 %     Vitals Min/Max Last 24 Hours:  Temp  Min: 36.2 °C (97.2 °F)  Max: 37.2 °C (99 °F)  Pulse  Min: 78  Max: 95  Resp  Min: 15  Max: 18  BP  Min: 119/69  Max: 136/85    Intake/Output:   No intake or output data in the 24 hours ending 24 3583    Physical Exam:  Incision: healing well, no drainage, no erythema, no swelling, well approximated. Scar tissue vs hematoma vs seroma R superior aspect of incision, approx 5 x 2cm, non-tender.  General: Examination  reveals a well developed, well nourished, female, in no acute distress. She is alert and cooperative.  Lungs: symmetrical, non-labored breathing.  Cardiac: warm, well-perfused.  Abdomen: soft, non-tender.  Fundus: firm, below umbilicus, and nontender.  Extremities: no redness or tenderness in the calves or thighs.  Neurological: alert, oriented, normal speech, no focal findings or movement disorder noted.     Lab Data:  Labs in chart were reviewed.

## 2024-07-20 NOTE — ANESTHESIA POSTPROCEDURE EVALUATION
Patient: Yolis Arriaza    Procedure Summary       Date: 24 Room / Location: [facility] 04 / Virtual [facility] OB    Anesthesia Start: 1005 Anesthesia Stop: 1142    Procedure: OBGYN Delivery  Section Diagnosis: (Prior Uterine Surgery)    Surgeons: Olu Givens MD Responsible Provider: Sujatha Jiang MD    Anesthesia Type: spinal ASA Status: 3            Anesthesia Type: spinal    Vitals Value Taken Time   /99 24 1326   Temp 36 °C (96.8 °F) 24 1303   Pulse 64 24 1326   Resp 12 24 0920   SpO2 99 % 24 1205       Anesthesia Post Evaluation    Patient location during evaluation: floor  Patient participation: complete - patient participated  Level of consciousness: awake  Pain management: adequate  Multimodal analgesia pain management approach  Airway patency: patent  Cardiovascular status: hemodynamically stable and acceptable  Respiratory status: acceptable and room air  Hydration status: acceptable  Postoperative Nausea and Vomiting: none      Patient denies back pain, N/V. No signs of PDPH. Denies difficulty with ambulation. Voiding without issue.     Patient resting comfortably in bed. Epidural site checked. No swelling, erythema, warmth or drainage noted.    No notable events documented.

## 2024-07-20 NOTE — CARE PLAN
The patient's goals for the shift include  resting and bonding with baby    The clinical goals for the shift include VSS. no s/sx of PPH or infection. no s/sx of worsening HDP. BPs to remain < 160/110        Problem: Postpartum  Goal: Experiences normal postpartum course  Outcome: Progressing  Goal: Appropriate maternal -  bonding  Outcome: Progressing  Goal: Establish and maintain infant feeding pattern for adequate nutrition  Outcome: Progressing  Goal: Incisions, wounds, or drain sites healing without S/S of infection  Outcome: Progressing  Goal: No s/sx infection  Outcome: Progressing  Goal: No s/sx of hemorrhage  Outcome: Progressing  Goal: Minimal s/sx of HDP and BP<160/110  Outcome: Progressing

## 2024-07-20 NOTE — CARE PLAN
Problem: Postpartum  Goal: Experiences normal postpartum course  Outcome: Progressing  Goal: Appropriate maternal -  bonding  Outcome: Progressing  Goal: Incisions, wounds, or drain sites healing without S/S of infection  Outcome: Progressing  Goal: Minimal s/sx of HDP and BP<160/110  Outcome: Progressing

## 2024-07-21 ENCOUNTER — PHARMACY VISIT (OUTPATIENT)
Dept: PHARMACY | Facility: CLINIC | Age: 29
End: 2024-07-21
Payer: MEDICAID

## 2024-07-21 VITALS
HEART RATE: 85 BPM | BODY MASS INDEX: 27.55 KG/M2 | TEMPERATURE: 97.3 F | SYSTOLIC BLOOD PRESSURE: 138 MMHG | OXYGEN SATURATION: 96 % | DIASTOLIC BLOOD PRESSURE: 88 MMHG | WEIGHT: 165.34 LBS | HEIGHT: 65 IN | RESPIRATION RATE: 16 BRPM

## 2024-07-21 PROBLEM — Z98.891 HISTORY OF CESAREAN DELIVERY: Status: RESOLVED | Noted: 2024-07-18 | Resolved: 2024-07-21

## 2024-07-21 PROBLEM — O23.43 URINARY TRACT INFECTION IN MOTHER DURING THIRD TRIMESTER OF PREGNANCY (HHS-HCC): Status: RESOLVED | Noted: 2024-06-26 | Resolved: 2024-07-21

## 2024-07-21 PROCEDURE — 90471 IMMUNIZATION ADMIN: CPT

## 2024-07-21 PROCEDURE — 99238 HOSP IP/OBS DSCHRG MGMT 30/<: CPT

## 2024-07-21 PROCEDURE — RXMED WILLOW AMBULATORY MEDICATION CHARGE

## 2024-07-21 PROCEDURE — 2500000002 HC RX 250 W HCPCS SELF ADMINISTERED DRUGS (ALT 637 FOR MEDICARE OP, ALT 636 FOR OP/ED): Performed by: STUDENT IN AN ORGANIZED HEALTH CARE EDUCATION/TRAINING PROGRAM

## 2024-07-21 PROCEDURE — 2500000004 HC RX 250 GENERAL PHARMACY W/ HCPCS (ALT 636 FOR OP/ED)

## 2024-07-21 PROCEDURE — 90707 MMR VACCINE SC: CPT

## 2024-07-21 PROCEDURE — 2500000001 HC RX 250 WO HCPCS SELF ADMINISTERED DRUGS (ALT 637 FOR MEDICARE OP)

## 2024-07-21 RX ORDER — IBUPROFEN 600 MG/1
600 TABLET ORAL EVERY 6 HOURS PRN
Qty: 60 TABLET | Refills: 0 | Status: SHIPPED | OUTPATIENT
Start: 2024-07-21

## 2024-07-21 RX ORDER — ACETAMINOPHEN 325 MG/1
975 TABLET ORAL EVERY 6 HOURS PRN
Qty: 120 TABLET | Refills: 0 | Status: SHIPPED | OUTPATIENT
Start: 2024-07-21

## 2024-07-21 RX ORDER — NICOTINE 7MG/24HR
1 PATCH, TRANSDERMAL 24 HOURS TRANSDERMAL DAILY
Qty: 30 PATCH | Refills: 2 | Status: SHIPPED | OUTPATIENT
Start: 2024-07-22

## 2024-07-21 RX ORDER — NIFEDIPINE 60 MG/1
120 TABLET, FILM COATED, EXTENDED RELEASE ORAL
Qty: 84 TABLET | Refills: 0 | Status: SHIPPED | OUTPATIENT
Start: 2024-07-22

## 2024-07-21 RX ORDER — POLYETHYLENE GLYCOL 3350 17 G/17G
17 POWDER, FOR SOLUTION ORAL 2 TIMES DAILY PRN
Qty: 14 PACKET | Refills: 0 | Status: SHIPPED | OUTPATIENT
Start: 2024-07-21

## 2024-07-21 RX ORDER — ENALAPRIL MALEATE 5 MG/1
5 TABLET ORAL DAILY
Qty: 60 TABLET | Refills: 1 | Status: SHIPPED | OUTPATIENT
Start: 2024-07-21

## 2024-07-21 RX ADMIN — NIFEDIPINE 120 MG: 60 TABLET, FILM COATED, EXTENDED RELEASE ORAL at 06:52

## 2024-07-21 RX ADMIN — IBUPROFEN 600 MG: 600 TABLET, FILM COATED ORAL at 12:16

## 2024-07-21 RX ADMIN — MEASLES, MUMPS, AND RUBELLA VIRUS VACCINE LIVE 0.5 ML: 1000; 12500; 1000 INJECTION, POWDER, LYOPHILIZED, FOR SUSPENSION SUBCUTANEOUS at 11:16

## 2024-07-21 RX ADMIN — ACETAMINOPHEN 975 MG: 325 TABLET ORAL at 00:33

## 2024-07-21 RX ADMIN — IBUPROFEN 600 MG: 600 TABLET, FILM COATED ORAL at 00:33

## 2024-07-21 RX ADMIN — IBUPROFEN 600 MG: 600 TABLET, FILM COATED ORAL at 06:52

## 2024-07-21 RX ADMIN — ACETAMINOPHEN 975 MG: 325 TABLET ORAL at 12:15

## 2024-07-21 RX ADMIN — ACETAMINOPHEN 975 MG: 325 TABLET ORAL at 06:52

## 2024-07-21 ASSESSMENT — PAIN SCALES - GENERAL
PAINLEVEL_OUTOF10: 3
PAINLEVEL_OUTOF10: 0 - NO PAIN
PAINLEVEL_OUTOF10: 0 - NO PAIN

## 2024-07-21 NOTE — DISCHARGE SUMMARY
Discharge Summary    Yolis Arriaza is a 29 y.o., , who delivered at 35w2d gestation via rCS in s/o PTL and siPEC w/ SF and is now postpartum day 3.   Admission Date: 2024  Discharge Date: 24       Discharge Diagnosis   delivery delivered ([facility])    Hospital Course  Delivery Date: 2024 10:38 AM  Delivery type: , Low Transverse   GA at delivery: 35w2d   Outcome: Living  Intrapartum complications: None  Feeding method: Breastfeeding Status: No     - h/o UTI in preg- urine culture negative on admission  - tobacco use, nicotine patch 7 mg ordered, rx for home sent; counseled on tobacco cessation, resources provided: [employer] website     siPEC w/SF  - Patient previously with cHTN on nifedipine 60mg, though did not take on day of admission  - Severe range BPs on presentation requiring treatment with IV labetalol 20mgx1  - HELLP labs neg x2  - Asymptomatic  - s/p Mg  - Nifedipine 120 mg ( PM) + enalapril 5 mg, BP largely WNL  - BP cuff for home  - Referral to cardiology to establish care at   - The signs and symptoms of PEC were reviewed with the patient, including unrelenting headache, vision changes/blurred vision, and pain underneath the right breast.   - BP cuff for home for checking BP BID. Pt instructed to call primary OB if SBP > 160 or DBP > 110     Maternal Well-Being  - emotional support provided  - bonding with infant     [place] Feeding  - breastfeeding/pumping encouraged; lactation consult prn     Procedures: IUD insertion  Contraception at discharge: LNG IUD    Pertinent Physical Exam At Time of Discharge    General: Examination reveals a well developed, well nourished, female, in no acute distress. She is alert and cooperative.  Lungs: symmetrical, non-labored breathing.  Cardiac: warm, well-perfused.  Abdomen: soft, non-tender.  Incision: Well approximated, without erythema/edema/drainage  Fundus: firm, below umbilicus, appropriately tender  Extremities: no  "redness or tenderness in the calves or thighs.  Neurological: alert, oriented, normal speech, no focal findings or movement disorder noted.     Vitals  /88 (BP Location: Right arm, Patient Position: Sitting)   Pulse 85   Temp 36.3 °C (97.3 °F) (Temporal)   Resp 16   Ht 1.651 m (5' 5\")   Wt 75 kg (165 lb 5.5 oz)   LMP 02/14/2024   SpO2 96%   Breastfeeding No   BMI 27.51 kg/m²      Discharge Meds     Your medication list        START taking these medications        Instructions Last Dose Given Next Dose Due   acetaminophen 325 mg tablet  Commonly known as: Tylenol      Take 3 tablets (975 mg) by mouth every 6 hours if needed for mild pain (1 - 3) or moderate pain (4 - 6).       enalapril 5 mg tablet  Commonly known as: Vasotec      Take 1 tablet (5 mg) by mouth once daily.       ibuprofen 600 mg tablet      Take 1 tablet (600 mg) by mouth every 6 hours if needed for mild pain (1 - 3) or moderate pain (4 - 6).       nicotine 7 mg/24 hr patch  Commonly known as: Nicoderm CQ  Start taking on: July 22, 2024      Place 1 patch over 24 hours on the skin once daily.       polyethylene glycol 17 gram packet  Commonly known as: Glycolax, Miralax      Take 17 g by mouth 2 times a day as needed (constipation).              CHANGE how you take these medications        Instructions Last Dose Given Next Dose Due   NIFEdipine ER 60 mg 24 hr tablet  Commonly known as: Adalat CC  Start taking on: July 22, 2024  What changed:   medication strength  how much to take      Take 2 tablets (120 mg) by mouth once daily in the morning. Take before meals. Do not crush, chew, or split.              CONTINUE taking these medications        Instructions Last Dose Given Next Dose Due   Advocate Blood Pressure Monitr kit  Generic drug: blood pressure test kit-large      1 Units once daily.       nicotine polacrilex 2 mg gum  Commonly known as: Nicorette      Chew 1 each (2 mg) every 2 hours if needed for smoking cessation.     "   Prenatal 28 mg iron- 800 mcg tablet  Generic drug: prenatal vitamin (iron-folic)      Take 1 tablet by mouth once daily.              STOP taking these medications      aspirin 81 mg EC tablet                  Where to Get Your Medications        These medications were sent to [facility]  [address], [address], [facility] [address]      Hours: 8AM to 6PM Mon-Fri, 8AM to 4PM Sat, 9AM to 1PM Sun Phone: [phone]   acetaminophen 325 mg tablet  enalapril 5 mg tablet  ibuprofen 600 mg tablet  nicotine 7 mg/24 hr patch  NIFEdipine ER 60 mg 24 hr tablet  polyethylene glycol 17 gram packet          Complications Requiring Follow-Up  None    Test Results Pending At Discharge  Pending Labs       Order Current Status    Surgical Pathology Exam - PLACENTA In process            Outpatient Follow-Up  Follow up with your OB provider in 2-5 days for BP check, in 2 weeks for incision check, and in 4-6 weeks for postpartum visit     I spent 15 minutes in the professional and overall care of this patient.      Deb Chacon, ESVIN-CNP

## 2024-07-21 NOTE — CARE PLAN
Problem: Postpartum  Goal: Experiences normal postpartum course  2024 by Kaylynn Rockwell RN  Outcome: Progressing  2024 by Kaylynn Rockwell RN  Outcome: Progressing     Problem: Postpartum  Goal: Appropriate maternal -  bonding  2024 by Kaylynn Rockwell RN  Outcome: Progressing  2024 by Kaylynn Rockwell RN  Outcome: Progressing     Problem: Postpartum  Goal: Incisions, wounds, or drain sites healing without S/S of infection  Outcome: Progressing     Problem: Postpartum  Goal: No s/sx infection  Outcome: Progressing     Problem: Postpartum  Goal: No s/sx of hemorrhage  Outcome: Progressing     Problem: Postpartum  Goal: Minimal s/sx of HDP and BP<160/110  Outcome: Progressing

## 2024-07-21 NOTE — PROGRESS NOTES
Social Work Assessment      Patient: Yolis Arriaza  Address: [address] [place] [address], [address]  Phone: [phone]     Referral Reason: anxiety history     Prenatal Care: transferred care to [facility] on 24 at 18.5 weeks  Barriers: Denies     Niland Name: Tita Emanuel  Niland : 24     Other Children: 9 year old daughter named Fara Zaman and 10 year old daughter named Bijal Zaman     FOB: Sourav Emanuel    Household Composition: Baby Girl will reside with both parents and two sisters    Supports: FAMILIA shared that she has support from family and friends.     IPV/DV or Safety Concerns: Denies     Safe Sleep Education: SW reviewed principles and importance of safe sleep. Baby Girl will sleep in a pack n play. Ms Arriaza verbalized understanding of safe sleep.     Transportation Concerns: Denies concerns     School/Work/Income: CONNIE works at [employer]. FAMILIA is a homemaker. FAMILIA has a WIC appt and plans to sign up for SNAP.     Insurance: [employer]     Substance Use History: Denies history     Mental Health Diagnoses/Concerns:  SW reviewed postpartum depression signs, symptoms, and resources and patient indicated understanding. FAMILIA has a history of anxiety and is not currently on medication. She denies current symptoms or HI/SI.  MOB but was receptive to resources if needed. Postpartum Support International and Maternal Mental Health Hotline info given.      Bonding: NO bonding concerns noted. Ms. Arriaza was observed holding baby girl securely in her arms and spoke very lovingly of her.     Department of Children and Family Services (DCFS): Denies history     Assessment:   SW completed a routine assessment due to FAMILIA's history of anxiety. FAMILIA was friendly, welcoming, and appeared to be in good spirits. FAMILIA denies exacerbation of her anxiety and feels that she is managing okay with medication at this time. She has strong support and resources. She was open to postpartum support international and  maternal mental health hotline resources. She denies needs/questions at this time and  did not identify any psychosocial needs at this time.     Plan:  This  will make a referral to [facility] as requested from [facility]. [facility] and Baby Girl are clear to discharge from a social work perspective when medically clear.     Signature: TRAY Harrell, RN

## 2024-07-22 LAB
BLOOD EXPIRATION DATE: NORMAL
DISPENSE STATUS: NORMAL
PRODUCT BLOOD TYPE: 5100
PRODUCT CODE: NORMAL
UNIT ABO: NORMAL
UNIT NUMBER: NORMAL
UNIT RH: NORMAL
UNIT VOLUME: 350
XM INTEP: NORMAL

## 2024-07-23 ENCOUNTER — APPOINTMENT (OUTPATIENT)
Dept: RADIOLOGY | Facility: CLINIC | Age: 29
End: 2024-07-23
Payer: COMMERCIAL

## 2024-07-25 LAB
LABORATORY COMMENT REPORT: NORMAL
PATH REPORT.FINAL DX SPEC: NORMAL
PATH REPORT.GROSS SPEC: NORMAL
PATH REPORT.RELEVANT HX SPEC: NORMAL
PATH REPORT.TOTAL CANCER: NORMAL

## 2024-07-30 ENCOUNTER — APPOINTMENT (OUTPATIENT)
Dept: RADIOLOGY | Facility: CLINIC | Age: 29
End: 2024-07-30
Payer: COMMERCIAL

## 2024-07-30 ENCOUNTER — APPOINTMENT (OUTPATIENT)
Dept: OBSTETRICS AND GYNECOLOGY | Facility: CLINIC | Age: 29
End: 2024-07-30
Payer: COMMERCIAL

## 2024-08-05 ENCOUNTER — APPOINTMENT (OUTPATIENT)
Dept: OBSTETRICS AND GYNECOLOGY | Facility: CLINIC | Age: 29
End: 2024-08-05
Payer: COMMERCIAL

## 2024-08-15 ENCOUNTER — POSTPARTUM VISIT (OUTPATIENT)
Dept: OBSTETRICS AND GYNECOLOGY | Facility: CLINIC | Age: 29
End: 2024-08-15
Payer: COMMERCIAL

## 2024-08-15 VITALS
SYSTOLIC BLOOD PRESSURE: 108 MMHG | WEIGHT: 141.9 LBS | HEART RATE: 90 BPM | BODY MASS INDEX: 23.64 KG/M2 | HEIGHT: 65 IN | DIASTOLIC BLOOD PRESSURE: 69 MMHG

## 2024-08-15 ASSESSMENT — PATIENT HEALTH QUESTIONNAIRE - PHQ9
2. FEELING DOWN, DEPRESSED OR HOPELESS: NOT AT ALL
SUM OF ALL RESPONSES TO PHQ9 QUESTIONS 1 AND 2: 0
1. LITTLE INTEREST OR PLEASURE IN DOING THINGS: NOT AT ALL

## 2024-08-15 ASSESSMENT — EDINBURGH POSTNATAL DEPRESSION SCALE (EPDS)
I HAVE BEEN ABLE TO LAUGH AND SEE THE FUNNY SIDE OF THINGS: AS MUCH AS I ALWAYS COULD
I HAVE BLAMED MYSELF UNNECESSARILY WHEN THINGS WENT WRONG: NOT VERY OFTEN
TOTAL SCORE: 1
I HAVE FELT SAD OR MISERABLE: NO, NOT AT ALL
I HAVE LOOKED FORWARD WITH ENJOYMENT TO THINGS: AS MUCH AS I EVER DID
I HAVE BEEN SO UNHAPPY THAT I HAVE BEEN CRYING: NO, NEVER
I HAVE FELT SCARED OR PANICKY FOR NO GOOD REASON: NO, NOT AT ALL
I HAVE BEEN SO UNHAPPY THAT I HAVE HAD DIFFICULTY SLEEPING: NOT AT ALL
I HAVE BEEN ANXIOUS OR WORRIED FOR NO GOOD REASON: NO, NOT AT ALL
THE THOUGHT OF HARMING MYSELF HAS OCCURRED TO ME: NEVER
THINGS HAVE BEEN GETTING ON TOP OF ME: NO, I HAVE BEEN COPING AS WELL AS EVER

## 2024-08-15 ASSESSMENT — ENCOUNTER SYMPTOMS
PSYCHIATRIC NEGATIVE: 0
EYES NEGATIVE: 0
RESPIRATORY NEGATIVE: 0
HEMATOLOGIC/LYMPHATIC NEGATIVE: 0
GASTROINTESTINAL NEGATIVE: 0
NEUROLOGICAL NEGATIVE: 0
CONSTITUTIONAL NEGATIVE: 0
ALLERGIC/IMMUNOLOGIC NEGATIVE: 0
MUSCULOSKELETAL NEGATIVE: 0
CARDIOVASCULAR NEGATIVE: 0
ENDOCRINE NEGATIVE: 0

## 2024-08-15 ASSESSMENT — PAIN SCALES - GENERAL: PAINLEVEL: 0-NO PAIN

## 2024-08-15 NOTE — PROGRESS NOTES
Plan    Advised to call office for breast complaints, abnormal bleeding, mood changes, or other concerning symptoms.   Cleared to resume normal activity as desired  Advised patient to discontinue nifedipine and check BP daily at home; advised to call with elevated BP >140/90    Diagnoses and all orders for this visit:  Postpartum normal course ([facility])    Agustina Rivera, JOHNNY, APRN-CNP    Subjective   29 y.o.  presenting for postpartum follow-up     Delivery Date: 7/15/24  Gestational Age: 35.2  Type of Delivery: , Low Transverse (repeat CS in setting of  labor, siPEC w/SF)    Pregnancy Problems (from 24 to present)       Problem Noted Resolved    Pre-eclampsia superimposed on chronic hypertension ([facility]) 2024 by Bernarda Parada MD No    Maternal varicella, non-immune ([facility]) 2024 by JOHNNY Cardenas No    Overview Signed 2024  8:38 AM by JOHNNY Cardenas     -offer vaccination PP            Rubella non-immune status, antepartum ([facility]) 2024 by JOHNNY Cardenas No    Overview Signed 2024  8:38 AM by JOHNNY Cardenas     -offer vaccination PP            Hx of  section 2024 by JOHNNY Cardenas No    Overview Signed 2024  1:34 PM by JOHNNY Cardenas     X2  2014 per patient at term for meconium  2015 ERCB  *need to sign records release re OP notes         Encounter for smoking cessation counseling 2024 by JOHNNY Cardenas No    Overview Signed 2024  1:35 PM by JOHNNY Cardenas     Nicotine gum rx sent  Encouraged decreasing/cessation          Supervision of high risk pregnancy in third trimester ([facility]) 2024 by Bijal Cline MD 7/3/2024 by JOHNNY Cardenas    Overview Addendum 2024  9:22 AM by Bijal Cline MD     Dating: very unsure LMP, dated by 32wk anatomy  US  [x] Initial BMI: 26  [x] Prenatal Labs: wnl, at 32 wks  [] Aneuploidy Screening:    [] Baby ASA:  [x] Anatomy US: limited, repeat saida for   [x] 1hr GCT at 24-28wks: to be collected   [x] Tdap (27-36wks): completed   [x] Rhogam (if Rh neg): N/A O POS  [] GBS at 36 wks:  [] Breastfeeding  [] PPBC:   [] 39 weeks discussion of IOL vs. Expectant management:   [] Mode of delivery: hx CS x2, unsure if wants to TOLAC or rCS.         Limited prenatal care in third trimester ([facility]) 2024 by Bijal Cline MD 7/3/2024 by JOHNNY Cardenas    Overview Signed 2024  9:49 AM by Bijal Cline MD     Late to establish care, New OB visit  with very unsure LMP dating approx 18wks.   Anatomy scan  dating 32wks.         Urinary tract infection in mother during third trimester of pregnancy ([facility]) 2024 by JOHNNY Cardenas 2024 by ANTELMO Yanez    Overview Signed 2024  1:36 PM by JOHNNY Cardenas     +UTI at new OB  [ ] VIJAY         Gestational hypertension, third trimester ([facility]) 2024 by JOHNNY Cardenas 2024 by ANTELMO Yanez    Overview Addendum 2024  9:49 AM by Bijal Cline MD     Baseline HELLP labs WNL, p/c 0.15,   BP cuff sent, Nifed 60mg daily  Weekly NSTs  Delivery 37wks            Concerns: tenderness around incision  Checking BP at home (110s/70s), currently taking nifedipine 120mg    Pain: controlled  Lacerations: n/a  Lochia: intermittent spotting and occasionally light flow with movement  Sexual Intimacy: No  Contraceptive Method: IUD  Feeding Method: She is breast feeding with some supplementation. no breast or nursing problems  Lactation Consult Needed?: No    Birth Trauma: No  Bonding with Baby: well with baby girl, [facility]  Mood: Denies concerns    Postpartum Depression: Low Risk  (8/15/2024)    [place]  Depression Scale     Last EPDS Total  "Score: 1     Last EPDS Self Harm Result: Never     Last pap: 6/2024 NILM    Objective    /69   Pulse 90   Ht 1.651 m (5' 5\")   Wt 64.4 kg (141 lb 14.4 oz)   LMP 08/09/2024 (Exact Date)   Breastfeeding No   BMI 23.61 kg/m²    Physical Exam  Constitutional:       Appearance: Normal appearance.   Pulmonary:      Effort: Pulmonary effort is normal.   Abdominal:      Palpations: Abdomen is soft.      Tenderness: There is no abdominal tenderness.          Comments: CS incision healing well, without erythema or drainage present   Neurological:      General: No focal deficit present.      Mental Status: She is alert and oriented to person, place, and time. Mental status is at baseline.   Skin:     General: Skin is warm and dry.   Psychiatric:         Mood and Affect: Mood normal.         Behavior: Behavior normal.         Thought Content: Thought content normal.         Judgment: Judgment normal.        "

## 2024-08-19 ENCOUNTER — APPOINTMENT (OUTPATIENT)
Dept: OBSTETRICS AND GYNECOLOGY | Facility: CLINIC | Age: 29
End: 2024-08-19
Payer: COMMERCIAL

## 2024-09-09 ENCOUNTER — APPOINTMENT (OUTPATIENT)
Dept: OBSTETRICS AND GYNECOLOGY | Facility: CLINIC | Age: 29
End: 2024-09-09
Payer: COMMERCIAL

## 2024-09-16 ENCOUNTER — APPOINTMENT (OUTPATIENT)
Dept: OBSTETRICS AND GYNECOLOGY | Facility: CLINIC | Age: 29
End: 2024-09-16
Payer: COMMERCIAL

## 2024-09-30 ENCOUNTER — APPOINTMENT (OUTPATIENT)
Dept: OBSTETRICS AND GYNECOLOGY | Facility: CLINIC | Age: 29
End: 2024-09-30
Payer: COMMERCIAL

## 2024-10-14 ENCOUNTER — APPOINTMENT (OUTPATIENT)
Dept: OBSTETRICS AND GYNECOLOGY | Facility: CLINIC | Age: 29
End: 2024-10-14
Payer: COMMERCIAL

## (undated) DEVICE — SUTURE, MONOCRYL, 2-0, 36 IN, CT-1, UNDYED

## (undated) DEVICE — GLOVE, SURGICAL, PROTEXIS PI BLUE W/NEUTHERA, 6.5, PF, LF

## (undated) DEVICE — DRAPE PACK, CESAREAN SECTION, CUSTOM, UHC

## (undated) DEVICE — SUTURE, CHROMIC, 0, 54 IN, TIE, BROWN

## (undated) DEVICE — SUTURE, VICRYL, 0, 36 IN, CT, UNDYED

## (undated) DEVICE — TOWEL PACK, STERILE, 4/PACK, BLUE

## (undated) DEVICE — GLOVE, SURGICAL, PROTEXIS PI MICRO, 6.5, PF, LF

## (undated) DEVICE — SUTURE, VICRYL, 4-0, 27 IN, PS-1, UNDYED

## (undated) DEVICE — SUTURE, PDS II, 0, 60 IN, CTX, VIOLET